# Patient Record
Sex: FEMALE | Race: WHITE | NOT HISPANIC OR LATINO | Employment: FULL TIME | URBAN - NONMETROPOLITAN AREA
[De-identification: names, ages, dates, MRNs, and addresses within clinical notes are randomized per-mention and may not be internally consistent; named-entity substitution may affect disease eponyms.]

---

## 2018-05-23 ENCOUNTER — TRANSCRIBE ORDERS (OUTPATIENT)
Dept: LAB | Facility: HOSPITAL | Age: 38
End: 2018-05-23

## 2018-05-23 ENCOUNTER — APPOINTMENT (OUTPATIENT)
Dept: LAB | Facility: HOSPITAL | Age: 38
End: 2018-05-23

## 2018-05-23 DIAGNOSIS — B35.1 ONYCHOMYCOSIS: Primary | ICD-10-CM

## 2018-05-23 LAB — KOH PREP NAIL: NORMAL

## 2018-05-23 PROCEDURE — 87102 FUNGUS ISOLATION CULTURE: CPT | Performed by: NURSE PRACTITIONER

## 2018-05-23 PROCEDURE — 87220 TISSUE EXAM FOR FUNGI: CPT | Performed by: NURSE PRACTITIONER

## 2018-06-20 LAB — FUNGUS WND CULT: NORMAL

## 2018-08-06 ENCOUNTER — OFFICE VISIT (OUTPATIENT)
Dept: FAMILY MEDICINE CLINIC | Facility: CLINIC | Age: 38
End: 2018-08-06

## 2018-08-06 VITALS
WEIGHT: 128 LBS | SYSTOLIC BLOOD PRESSURE: 124 MMHG | BODY MASS INDEX: 21.85 KG/M2 | DIASTOLIC BLOOD PRESSURE: 80 MMHG | HEIGHT: 64 IN

## 2018-08-06 DIAGNOSIS — H93.13 TINNITUS OF BOTH EARS: Primary | ICD-10-CM

## 2018-08-06 DIAGNOSIS — Z00.00 GENERAL MEDICAL EXAM: ICD-10-CM

## 2018-08-06 DIAGNOSIS — H93.13 TINNITUS OF BOTH EARS: ICD-10-CM

## 2018-08-06 DIAGNOSIS — R20.9 SKIN SENSATION DISTURBANCE: ICD-10-CM

## 2018-08-06 DIAGNOSIS — H53.9 CHANGES IN VISION: ICD-10-CM

## 2018-08-06 DIAGNOSIS — R42 VERTIGO: ICD-10-CM

## 2018-08-06 DIAGNOSIS — R53.81 MALAISE AND FATIGUE: ICD-10-CM

## 2018-08-06 DIAGNOSIS — R53.83 MALAISE AND FATIGUE: ICD-10-CM

## 2018-08-06 DIAGNOSIS — R42 VERTIGO: Primary | ICD-10-CM

## 2018-08-06 PROCEDURE — 99204 OFFICE O/P NEW MOD 45 MIN: CPT | Performed by: NURSE PRACTITIONER

## 2018-08-06 RX ORDER — BUSPIRONE HYDROCHLORIDE 10 MG/1
TABLET ORAL
Qty: 60 TABLET | Refills: 1 | Status: SHIPPED | OUTPATIENT
Start: 2018-08-06 | End: 2019-08-30

## 2018-08-06 RX ORDER — ESCITALOPRAM OXALATE 10 MG/1
10 TABLET ORAL DAILY
Qty: 30 TABLET | Refills: 11 | Status: SHIPPED | OUTPATIENT
Start: 2018-08-06 | End: 2019-08-30

## 2018-08-06 RX ORDER — TRIAMTERENE AND HYDROCHLOROTHIAZIDE 37.5; 25 MG/1; MG/1
1 TABLET ORAL DAILY
Qty: 30 TABLET | Refills: 11 | Status: SHIPPED | OUTPATIENT
Start: 2018-08-06 | End: 2019-08-30

## 2018-08-06 NOTE — PROGRESS NOTES
Chief Complaint   Patient presents with   • Establish Care     balance issues and dizziness     Subjective   Kathy Munoz is a 37 y.o. female.     Presents with dizziness, worsening vertigo and vision changes-mother has MS-dx in her early 40's-the current patient is having the same symptoms as her mom did before dx-vertigo is worsening       Dizziness   This is a recurrent problem. The current episode started more than 1 year ago. The problem occurs constantly. The problem has been gradually worsening. Associated symptoms include numbness and vertigo. Pertinent negatives include no abdominal pain, anorexia, arthralgias, change in bowel habit, chest pain, chills, congestion, coughing, diaphoresis, fatigue, fever, headaches, joint swelling, myalgias, nausea, neck pain, rash, sore throat, swollen glands, urinary symptoms, visual change, vomiting or weakness. The symptoms are aggravated by walking. She has tried rest for the symptoms. The treatment provided mild relief.   Anxiety   Presents for follow-up visit. Symptoms include decreased concentration, dizziness, excessive worry, insomnia, irritability, malaise, nervous/anxious behavior and panic. Patient reports no chest pain, compulsions, confusion, feeling of choking, hyperventilation, impotence, muscle tension, nausea, obsessions, palpitations, restlessness, shortness of breath or suicidal ideas. Symptoms occur most days.       Eye Problem    Both eyes are affected.This is a new problem. The current episode started 1 to 4 weeks ago. The problem occurs every several days. The problem has been gradually worsening. There was no injury mechanism. The patient is experiencing no pain. There is no known exposure to pink eye. Associated symptoms include blurred vision, an eye discharge, double vision, a foreign body sensation and photophobia. Pertinent negatives include no eye redness, fever, itching, nausea, vomiting or weakness. She has tried nothing for the  symptoms. The treatment provided no relief.        The following portions of the patient's history were reviewed and updated as appropriate: allergies, current medications, past social history and problem list.    Review of Systems   Constitutional: Positive for irritability. Negative for appetite change, chills, diaphoresis, fatigue, fever and unexpected weight change.   HENT: Positive for hearing loss and tinnitus. Negative for congestion, sore throat, trouble swallowing and voice change.    Eyes: Positive for blurred vision, double vision, photophobia, discharge and visual disturbance. Negative for pain, redness and itching.   Respiratory: Negative.  Negative for apnea, cough, choking, chest tightness, shortness of breath, wheezing and stridor.    Cardiovascular: Negative.  Negative for chest pain, palpitations and leg swelling.   Gastrointestinal: Negative.  Negative for abdominal pain, anal bleeding, anorexia, blood in stool, change in bowel habit, constipation, nausea and vomiting.   Endocrine: Negative.  Negative for cold intolerance, heat intolerance, polydipsia, polyphagia and polyuria.   Genitourinary: Negative.  Negative for difficulty urinating and impotence.   Musculoskeletal: Negative for arthralgias, back pain, gait problem, joint swelling, myalgias, neck pain and neck stiffness.   Skin: Negative.  Negative for color change, pallor and rash.   Allergic/Immunologic: Negative.  Negative for environmental allergies, food allergies and immunocompromised state.   Neurological: Positive for dizziness, vertigo, light-headedness and numbness. Negative for tremors, seizures, syncope, facial asymmetry, speech difficulty, weakness and headaches.        Tingling around nose and both fingers-concerned that symptoms are worsening    Hematological: Negative.  Negative for adenopathy. Does not bruise/bleed easily.   Psychiatric/Behavioral: Positive for decreased concentration and sleep disturbance. Negative for  "agitation, behavioral problems, confusion, dysphoric mood, hallucinations, self-injury and suicidal ideas. The patient is nervous/anxious and has insomnia. The patient is not hyperactive.        Objective   /80   Ht 162.6 cm (64\")   Wt 58.1 kg (128 lb)   BMI 21.97 kg/m²   Physical Exam   Constitutional: She is oriented to person, place, and time. She appears well-developed and well-nourished.   HENT:   Head: Normocephalic and atraumatic.   Right Ear: External ear normal.   Left Ear: External ear normal.   Mouth/Throat: Oropharynx is clear and moist.   Eyes: Pupils are equal, round, and reactive to light. EOM are normal. Right eye exhibits no discharge. Left eye exhibits no discharge. No scleral icterus.   Neck: Normal range of motion. Neck supple. No JVD present. No tracheal deviation present. No thyromegaly present.   Cardiovascular: Normal rate, regular rhythm, normal heart sounds and intact distal pulses.  Exam reveals no friction rub.    No murmur heard.  Pulmonary/Chest: Effort normal and breath sounds normal. No stridor. No respiratory distress. She has no wheezes. She has no rales. She exhibits no tenderness.   Abdominal: Soft. Bowel sounds are normal. She exhibits no distension and no mass. There is no tenderness. There is no rebound and no guarding. No hernia.   Musculoskeletal: Normal range of motion. She exhibits no edema, tenderness or deformity.   Lymphadenopathy:     She has no cervical adenopathy.   Neurological: She is alert and oriented to person, place, and time. She displays normal reflexes. No cranial nerve deficit or sensory deficit. She exhibits normal muscle tone. Coordination normal.   Nystagmus with lateral gaze-especially to the left -vertigo and vision change reproduced during exam    Skin: Skin is warm and dry. No rash noted. No erythema. No pallor.   Nursing note and vitals reviewed.      Assessment/Plan   Problem List Items Addressed This Visit        Nervous and Auditory    " Tinnitus of both ears    Relevant Orders    MRI Brain Without Contrast    Skin sensation disturbance       Other    Malaise and fatigue    Relevant Orders    CBC & Differential    Comprehensive Metabolic Panel    Iron    Vitamin D 25 Hydroxy    Vitamin B12    TSH    Magnesium    Lipid Panel    MRI Brain Without Contrast    Vertigo - Primary    Relevant Orders    CBC & Differential    Comprehensive Metabolic Panel    Iron    Vitamin D 25 Hydroxy    Vitamin B12    TSH    Magnesium    Lipid Panel    MRI Brain Without Contrast    General medical exam    Relevant Orders    MRI Brain Without Contrast    Changes in vision           New Medications Ordered This Visit   Medications   • busPIRone (BUSPAR) 10 MG tablet     Sig: Use tid prn     Dispense:  60 tablet     Refill:  1   • triamterene-hydrochlorothiazide (MAXZIDE-25) 37.5-25 MG per tablet     Sig: Take 1 tablet by mouth Daily.     Dispense:  30 tablet     Refill:  11   • escitalopram (LEXAPRO) 10 MG tablet     Sig: Take 1 tablet by mouth Daily.     Dispense:  30 tablet     Refill:  11       Mri brain to rule out MS due to having the same symptoms as her mother and her mother has MS dx in her 40's-ER if worsen patient agrees with plan of action     Stress relief discussed in length. Consider therapy, suggest yoga, exercise, meditation -patient is agrees-will call back if worsen for referral

## 2018-08-09 ENCOUNTER — APPOINTMENT (OUTPATIENT)
Dept: LAB | Facility: HOSPITAL | Age: 38
End: 2018-08-09

## 2018-08-09 ENCOUNTER — TELEPHONE (OUTPATIENT)
Dept: FAMILY MEDICINE CLINIC | Facility: CLINIC | Age: 38
End: 2018-08-09

## 2018-08-09 LAB
25(OH)D3 SERPL-MCNC: 44.8 NG/ML (ref 30–100)
ALBUMIN SERPL-MCNC: 4.6 G/DL (ref 3.4–4.8)
ALBUMIN/GLOB SERPL: 1.3 G/DL (ref 1.1–1.8)
ALP SERPL-CCNC: 55 U/L (ref 38–126)
ALT SERPL W P-5'-P-CCNC: 21 U/L (ref 9–52)
ANION GAP SERPL CALCULATED.3IONS-SCNC: 10 MMOL/L (ref 5–15)
ARTICHOKE IGE QN: 87 MG/DL (ref 1–129)
AST SERPL-CCNC: 28 U/L (ref 14–36)
BASOPHILS # BLD AUTO: 0.04 10*3/MM3 (ref 0–0.2)
BASOPHILS NFR BLD AUTO: 0.6 % (ref 0–2)
BILIRUB SERPL-MCNC: 0.5 MG/DL (ref 0.2–1.3)
BUN BLD-MCNC: 18 MG/DL (ref 7–21)
BUN/CREAT SERPL: 27.7 (ref 7–25)
CALCIUM SPEC-SCNC: 9.4 MG/DL (ref 8.4–10.2)
CHLORIDE SERPL-SCNC: 104 MMOL/L (ref 95–110)
CHOLEST SERPL-MCNC: 162 MG/DL (ref 0–199)
CO2 SERPL-SCNC: 26 MMOL/L (ref 22–31)
CREAT BLD-MCNC: 0.65 MG/DL (ref 0.5–1)
DEPRECATED RDW RBC AUTO: 40.4 FL (ref 36.4–46.3)
EOSINOPHIL # BLD AUTO: 0.09 10*3/MM3 (ref 0–0.7)
EOSINOPHIL NFR BLD AUTO: 1.4 % (ref 0–7)
ERYTHROCYTE [DISTWIDTH] IN BLOOD BY AUTOMATED COUNT: 13 % (ref 11.5–14.5)
GFR SERPL CREATININE-BSD FRML MDRD: 102 ML/MIN/1.73 (ref 64–149)
GLOBULIN UR ELPH-MCNC: 3.6 GM/DL (ref 2.3–3.5)
GLUCOSE BLD-MCNC: 89 MG/DL (ref 60–100)
HCT VFR BLD AUTO: 40 % (ref 35–45)
HDLC SERPL-MCNC: 48 MG/DL (ref 60–200)
HGB BLD-MCNC: 13.8 G/DL (ref 12–15.5)
IMM GRANULOCYTES # BLD: 0.02 10*3/MM3 (ref 0–0.02)
IMM GRANULOCYTES NFR BLD: 0.3 % (ref 0–0.5)
IRON 24H UR-MRATE: 129 MCG/DL (ref 37–170)
LDLC/HDLC SERPL: 2.14 {RATIO} (ref 0–3.22)
LYMPHOCYTES # BLD AUTO: 2.21 10*3/MM3 (ref 0.6–4.2)
LYMPHOCYTES NFR BLD AUTO: 34.3 % (ref 10–50)
MAGNESIUM SERPL-MCNC: 2.6 MG/DL (ref 1.6–2.3)
MCH RBC QN AUTO: 29.6 PG (ref 26.5–34)
MCHC RBC AUTO-ENTMCNC: 34.5 G/DL (ref 31.4–36)
MCV RBC AUTO: 85.7 FL (ref 80–98)
MONOCYTES # BLD AUTO: 0.54 10*3/MM3 (ref 0–0.9)
MONOCYTES NFR BLD AUTO: 8.4 % (ref 0–12)
NEUTROPHILS # BLD AUTO: 3.54 10*3/MM3 (ref 2–8.6)
NEUTROPHILS NFR BLD AUTO: 55 % (ref 37–80)
PLATELET # BLD AUTO: 348 10*3/MM3 (ref 150–450)
PMV BLD AUTO: 9.8 FL (ref 8–12)
POTASSIUM BLD-SCNC: 4.3 MMOL/L (ref 3.5–5.1)
PROT SERPL-MCNC: 8.2 G/DL (ref 6.3–8.6)
RBC # BLD AUTO: 4.67 10*6/MM3 (ref 3.77–5.16)
SODIUM BLD-SCNC: 140 MMOL/L (ref 137–145)
TRIGL SERPL-MCNC: 56 MG/DL (ref 20–199)
TSH SERPL DL<=0.05 MIU/L-ACNC: 2.28 MIU/ML (ref 0.46–4.68)
VIT B12 BLD-MCNC: 281 PG/ML (ref 239–931)
WBC NRBC COR # BLD: 6.44 10*3/MM3 (ref 3.2–9.8)

## 2018-08-09 PROCEDURE — 80053 COMPREHEN METABOLIC PANEL: CPT | Performed by: NURSE PRACTITIONER

## 2018-08-09 PROCEDURE — 83540 ASSAY OF IRON: CPT | Performed by: NURSE PRACTITIONER

## 2018-08-09 PROCEDURE — 82306 VITAMIN D 25 HYDROXY: CPT | Performed by: NURSE PRACTITIONER

## 2018-08-09 PROCEDURE — 85025 COMPLETE CBC W/AUTO DIFF WBC: CPT | Performed by: NURSE PRACTITIONER

## 2018-08-09 PROCEDURE — 80061 LIPID PANEL: CPT | Performed by: NURSE PRACTITIONER

## 2018-08-09 PROCEDURE — 84443 ASSAY THYROID STIM HORMONE: CPT | Performed by: NURSE PRACTITIONER

## 2018-08-09 PROCEDURE — 82607 VITAMIN B-12: CPT | Performed by: NURSE PRACTITIONER

## 2018-08-09 PROCEDURE — 83735 ASSAY OF MAGNESIUM: CPT | Performed by: NURSE PRACTITIONER

## 2018-08-09 PROCEDURE — 36415 COLL VENOUS BLD VENIPUNCTURE: CPT | Performed by: NURSE PRACTITIONER

## 2018-08-09 NOTE — TELEPHONE ENCOUNTER
----- Message from ROYER Rashid sent at 8/9/2018  4:06 PM CDT -----  Can you let her know normal except magnesium is a little high -see if she is taking a supplement-if so stop it-more water otherwise labs are normal

## 2018-08-15 ENCOUNTER — APPOINTMENT (OUTPATIENT)
Dept: MRI IMAGING | Facility: HOSPITAL | Age: 38
End: 2018-08-15

## 2018-08-21 ENCOUNTER — APPOINTMENT (OUTPATIENT)
Dept: MRI IMAGING | Facility: HOSPITAL | Age: 38
End: 2018-08-21

## 2018-08-30 ENCOUNTER — HOSPITAL ENCOUNTER (OUTPATIENT)
Dept: MRI IMAGING | Facility: HOSPITAL | Age: 38
Discharge: HOME | End: 2018-08-30
Admitting: NURSE PRACTITIONER

## 2018-08-30 DIAGNOSIS — H93.13 TINNITUS OF BOTH EARS: ICD-10-CM

## 2018-08-30 DIAGNOSIS — R42 VERTIGO: ICD-10-CM

## 2018-08-30 PROCEDURE — 70551 MRI BRAIN STEM W/O DYE: CPT

## 2019-03-07 DIAGNOSIS — Z80.3 FH: BREAST CANCER IN FIRST DEGREE RELATIVE: Primary | ICD-10-CM

## 2019-03-08 ENCOUNTER — TELEPHONE (OUTPATIENT)
Dept: FAMILY MEDICINE CLINIC | Facility: CLINIC | Age: 39
End: 2019-03-08

## 2019-03-08 DIAGNOSIS — Z12.31 ENCOUNTER FOR SCREENING MAMMOGRAM FOR MALIGNANT NEOPLASM OF BREAST: Primary | ICD-10-CM

## 2019-03-08 NOTE — TELEPHONE ENCOUNTER
[facility] CENTRAL SCHEDULING IS NEEDING  YOU TO CORRECT THE MAMMOGRAM ORDERS ON NAEEM RAUDEL  THEY HAVE TO HAVE A DIGN CODE FOR SCREENING PURPOSES  PLEASE ADD AND FX BACK  [phone]  YOU CAN CALL [facility]  EXT 1

## 2019-08-30 ENCOUNTER — OFFICE VISIT (OUTPATIENT)
Dept: FAMILY MEDICINE CLINIC | Facility: CLINIC | Age: 39
End: 2019-08-30

## 2019-08-30 VITALS
SYSTOLIC BLOOD PRESSURE: 120 MMHG | DIASTOLIC BLOOD PRESSURE: 80 MMHG | WEIGHT: 116 LBS | BODY MASS INDEX: 19.81 KG/M2 | HEIGHT: 64 IN

## 2019-08-30 DIAGNOSIS — Z00.00 WELLNESS EXAMINATION: Primary | ICD-10-CM

## 2019-08-30 DIAGNOSIS — Z12.4 ENCOUNTER FOR PAPANICOLAOU SMEAR FOR CERVICAL CANCER SCREENING: ICD-10-CM

## 2019-08-30 PROCEDURE — 87624 HPV HI-RISK TYP POOLED RSLT: CPT | Performed by: NURSE PRACTITIONER

## 2019-08-30 PROCEDURE — 99395 PREV VISIT EST AGE 18-39: CPT | Performed by: NURSE PRACTITIONER

## 2019-08-30 PROCEDURE — G0123 SCREEN CERV/VAG THIN LAYER: HCPCS | Performed by: NURSE PRACTITIONER

## 2019-08-30 NOTE — PROGRESS NOTES
Chief Complaint   Patient presents with   • Annual Exam     pap smear     Subjective   Kathy Munoz is a 38 y.o. female.     Gynecologic Exam   The patient's pertinent negatives include no genital itching, genital lesions, genital odor, genital rash, missed menses, pelvic pain or vaginal discharge. The patient is experiencing no pain. She is not pregnant. Pertinent negatives include no back pain, chills, constipation, diarrhea, dysuria, fever, headaches, hematuria, joint pain, nausea or rash. The treatment provided significant relief. She is sexually active.        The following portions of the patient's history were reviewed and updated as appropriate: allergies, current medications, past social history and problem list.    Review of Systems   Constitutional: Negative.  Negative for activity change, appetite change, chills, diaphoresis, fatigue, fever and unexpected weight change.   HENT: Negative.    Eyes: Negative.  Negative for photophobia, discharge, itching and visual disturbance.   Respiratory: Negative.    Cardiovascular: Negative.    Gastrointestinal: Negative.  Negative for constipation, diarrhea and nausea.   Endocrine: Negative.  Negative for cold intolerance, heat intolerance, polydipsia, polyphagia and polyuria.   Genitourinary: Negative.  Negative for dysuria, hematuria, menstrual problem, missed menses, pelvic pain and vaginal discharge.   Musculoskeletal: Negative.  Negative for back pain and joint pain.   Skin: Negative.  Negative for rash.   Allergic/Immunologic: Negative.  Negative for environmental allergies, food allergies and immunocompromised state.   Neurological: Negative.  Negative for tremors, seizures, syncope, speech difficulty, weakness, light-headedness, numbness and headaches.   Hematological: Negative.  Negative for adenopathy. Does not bruise/bleed easily.   Psychiatric/Behavioral: Negative.  Negative for agitation, sleep disturbance and suicidal ideas.       Objective  "  /80   Ht 162.6 cm (64\")   Wt 52.6 kg (116 lb)   BMI 19.91 kg/m²   Physical Exam   Constitutional: She is oriented to person, place, and time. She appears well-developed and well-nourished. No distress.   HENT:   Head: Normocephalic and atraumatic.   Right Ear: External ear normal.   Left Ear: External ear normal.   Mouth/Throat: Oropharynx is clear and moist. No oropharyngeal exudate.   Eyes: EOM are normal. Pupils are equal, round, and reactive to light. Right eye exhibits no discharge. Left eye exhibits no discharge. No scleral icterus.   Neck: Normal range of motion. Neck supple. No JVD present. No tracheal deviation present. No thyromegaly present.   Cardiovascular: Normal rate, regular rhythm, normal heart sounds and intact distal pulses. Exam reveals no gallop and no friction rub.   No murmur heard.  Pulmonary/Chest: Effort normal and breath sounds normal. No stridor. No respiratory distress. She has no wheezes. She has no rales. She exhibits no tenderness.   Implants bilaterally    Abdominal: Soft. Bowel sounds are normal. She exhibits no distension and no mass. There is no tenderness. There is no rebound and no guarding. No hernia.   Genitourinary: Rectum normal, vagina normal and uterus normal. Cervix exhibits no motion tenderness and no friability. Right adnexum displays no mass, no tenderness and no fullness. Left adnexum displays no mass, no tenderness and no fullness. No erythema, tenderness or bleeding in the vagina. No foreign body in the vagina. No signs of injury around the vagina. No vaginal discharge found.   Musculoskeletal: Normal range of motion. She exhibits no edema, tenderness or deformity.   Lymphadenopathy:     She has no cervical adenopathy.   Neurological: She is alert and oriented to person, place, and time. She displays normal reflexes. No cranial nerve deficit or sensory deficit. She exhibits normal muscle tone. Coordination normal.   Skin: Skin is warm and dry. No rash " noted. She is not diaphoretic. No erythema. No pallor.   Nursing note and vitals reviewed.      Assessment/Plan   Problem List Items Addressed This Visit        Other    Wellness examination - Primary         No orders of the defined types were placed in this encounter.      It's not just what you eat, but when you eat  Eat breakfast, and eat smaller meals throughout the day. A healthy breakfast can jumpstart your metabolism, while eating small, healthy meals (rather than the standard three large meals) keeps your energy up.   Avoid eating at night. Try to eat dinner earlier and fast for 14-16 hours until breakfast the next morning. Studies suggest that eating only when you’re most active and giving your digestive system a long break each day may help to regulate weight.     No changes for now -mammogram utd from another facility, labs ordered for fasting

## 2019-09-09 LAB
GEN CATEG CVX/VAG CYTO-IMP: NORMAL
LAB AP CASE REPORT: NORMAL
LAB AP GYN ADDITIONAL INFORMATION: NORMAL
PATH INTERP SPEC-IMP: NORMAL
STAT OF ADQ CVX/VAG CYTO-IMP: NORMAL

## 2019-09-10 ENCOUNTER — APPOINTMENT (OUTPATIENT)
Dept: LAB | Facility: HOSPITAL | Age: 39
End: 2019-09-10

## 2019-09-10 LAB
25(OH)D3 SERPL-MCNC: 45.8 NG/ML (ref 30–100)
ALBUMIN SERPL-MCNC: 4.6 G/DL (ref 3.5–5.2)
ALBUMIN/GLOB SERPL: 1.6 G/DL
ALP SERPL-CCNC: 51 U/L (ref 39–117)
ALT SERPL W P-5'-P-CCNC: 7 U/L (ref 1–33)
ANION GAP SERPL CALCULATED.3IONS-SCNC: 12.8 MMOL/L (ref 5–15)
AST SERPL-CCNC: 16 U/L (ref 1–32)
BASOPHILS # BLD AUTO: 0.07 10*3/MM3 (ref 0–0.2)
BASOPHILS NFR BLD AUTO: 1.4 % (ref 0–1.5)
BILIRUB SERPL-MCNC: 0.4 MG/DL (ref 0.2–1.2)
BUN BLD-MCNC: 17 MG/DL (ref 6–20)
BUN/CREAT SERPL: 23.9 (ref 7–25)
CALCIUM SPEC-SCNC: 9.7 MG/DL (ref 8.6–10.5)
CHLORIDE SERPL-SCNC: 102 MMOL/L (ref 98–107)
CHOLEST SERPL-MCNC: 158 MG/DL (ref 0–200)
CO2 SERPL-SCNC: 25.2 MMOL/L (ref 22–29)
CREAT BLD-MCNC: 0.71 MG/DL (ref 0.57–1)
DEPRECATED RDW RBC AUTO: 44 FL (ref 37–54)
EOSINOPHIL # BLD AUTO: 0.11 10*3/MM3 (ref 0–0.4)
EOSINOPHIL NFR BLD AUTO: 2.2 % (ref 0.3–6.2)
ERYTHROCYTE [DISTWIDTH] IN BLOOD BY AUTOMATED COUNT: 13.2 % (ref 12.3–15.4)
GFR SERPL CREATININE-BSD FRML MDRD: 92 ML/MIN/1.73
GLOBULIN UR ELPH-MCNC: 2.9 GM/DL
GLUCOSE BLD-MCNC: 80 MG/DL (ref 65–99)
HCT VFR BLD AUTO: 43 % (ref 34–46.6)
HDLC SERPL-MCNC: 50 MG/DL (ref 40–60)
HGB BLD-MCNC: 14 G/DL (ref 12–15.9)
IMM GRANULOCYTES # BLD AUTO: 0.01 10*3/MM3 (ref 0–0.05)
IMM GRANULOCYTES NFR BLD AUTO: 0.2 % (ref 0–0.5)
IRON 24H UR-MRATE: 108 MCG/DL (ref 37–145)
LDLC SERPL CALC-MCNC: 92 MG/DL (ref 0–100)
LDLC/HDLC SERPL: 1.84 {RATIO}
LYMPHOCYTES # BLD AUTO: 2.17 10*3/MM3 (ref 0.7–3.1)
LYMPHOCYTES NFR BLD AUTO: 42.7 % (ref 19.6–45.3)
MAGNESIUM SERPL-MCNC: 2.7 MG/DL (ref 1.6–2.6)
MCH RBC QN AUTO: 29.5 PG (ref 26.6–33)
MCHC RBC AUTO-ENTMCNC: 32.6 G/DL (ref 31.5–35.7)
MCV RBC AUTO: 90.5 FL (ref 79–97)
MONOCYTES # BLD AUTO: 0.4 10*3/MM3 (ref 0.1–0.9)
MONOCYTES NFR BLD AUTO: 7.9 % (ref 5–12)
NEUTROPHILS # BLD AUTO: 2.32 10*3/MM3 (ref 1.7–7)
NEUTROPHILS NFR BLD AUTO: 45.6 % (ref 42.7–76)
NRBC BLD AUTO-RTO: 0 /100 WBC (ref 0–0.2)
PLATELET # BLD AUTO: 292 10*3/MM3 (ref 140–450)
PMV BLD AUTO: 10.6 FL (ref 6–12)
POTASSIUM BLD-SCNC: 4.2 MMOL/L (ref 3.5–5.2)
PROT SERPL-MCNC: 7.5 G/DL (ref 6–8.5)
RBC # BLD AUTO: 4.75 10*6/MM3 (ref 3.77–5.28)
SODIUM BLD-SCNC: 140 MMOL/L (ref 136–145)
TRIGL SERPL-MCNC: 79 MG/DL (ref 0–150)
TSH SERPL DL<=0.05 MIU/L-ACNC: 2.87 UIU/ML (ref 0.27–4.2)
VIT B12 BLD-MCNC: 262 PG/ML (ref 211–946)
VLDLC SERPL-MCNC: 15.8 MG/DL (ref 5–40)
WBC NRBC COR # BLD: 5.08 10*3/MM3 (ref 3.4–10.8)

## 2019-09-10 PROCEDURE — 36415 COLL VENOUS BLD VENIPUNCTURE: CPT | Performed by: NURSE PRACTITIONER

## 2019-09-10 PROCEDURE — 82306 VITAMIN D 25 HYDROXY: CPT | Performed by: NURSE PRACTITIONER

## 2019-09-10 PROCEDURE — 80061 LIPID PANEL: CPT | Performed by: NURSE PRACTITIONER

## 2019-09-10 PROCEDURE — 83540 ASSAY OF IRON: CPT | Performed by: NURSE PRACTITIONER

## 2019-09-10 PROCEDURE — 85025 COMPLETE CBC W/AUTO DIFF WBC: CPT | Performed by: NURSE PRACTITIONER

## 2019-09-10 PROCEDURE — 82607 VITAMIN B-12: CPT | Performed by: NURSE PRACTITIONER

## 2019-09-10 PROCEDURE — 83735 ASSAY OF MAGNESIUM: CPT | Performed by: NURSE PRACTITIONER

## 2019-09-10 PROCEDURE — 80053 COMPREHEN METABOLIC PANEL: CPT | Performed by: NURSE PRACTITIONER

## 2019-09-10 PROCEDURE — 84443 ASSAY THYROID STIM HORMONE: CPT | Performed by: NURSE PRACTITIONER

## 2019-09-11 ENCOUNTER — TELEPHONE (OUTPATIENT)
Dept: FAMILY MEDICINE CLINIC | Facility: CLINIC | Age: 39
End: 2019-09-11

## 2019-09-11 DIAGNOSIS — R79.0 ABNORMAL BLOOD LEVEL OF MAGNESIUM: Primary | ICD-10-CM

## 2019-09-11 LAB — HPV I/H RISK 4 DNA CVX QL PROBE+SIG AMP: NEGATIVE

## 2019-09-11 NOTE — TELEPHONE ENCOUNTER
-Per ROYER Poole, Ms. Munoz has been called with recent lab, HPV and Pap Smear results & recommendations.  Continue current medications and follow-up as planned or sooner if any problems.        ---- Message from ROYER Small sent at 9/11/2019  8:04 AM CDT -----  Can you let her now her labs look good no problems noted-her mag is a little high -could just be a lab error-see if she is taking otc vitamins-if she wants we can check it at our lab to verify-I am not worried about it.

## 2019-09-21 ENCOUNTER — LAB (OUTPATIENT)
Dept: LAB | Facility: HOSPITAL | Age: 39
End: 2019-09-21

## 2019-09-21 DIAGNOSIS — R79.0 ABNORMAL BLOOD LEVEL OF MAGNESIUM: ICD-10-CM

## 2019-09-21 LAB — MAGNESIUM SERPL-MCNC: 2.4 MG/DL (ref 1.6–2.6)

## 2019-09-21 PROCEDURE — 83735 ASSAY OF MAGNESIUM: CPT

## 2019-09-21 PROCEDURE — 36415 COLL VENOUS BLD VENIPUNCTURE: CPT

## 2020-06-09 DIAGNOSIS — R00.2 PALPITATIONS: Primary | ICD-10-CM

## 2020-06-29 ENCOUNTER — HOSPITAL ENCOUNTER (OUTPATIENT)
Dept: CARDIOLOGY | Facility: HOSPITAL | Age: 40
Discharge: HOME | End: 2020-06-29

## 2020-06-29 DIAGNOSIS — R00.2 PALPITATIONS: ICD-10-CM

## 2020-06-29 DIAGNOSIS — R07.9 CHEST PAIN, UNSPECIFIED TYPE: ICD-10-CM

## 2020-06-29 LAB
BH CV STRESS BP STAGE 1: NORMAL
BH CV STRESS BP STAGE 2: NORMAL
BH CV STRESS BP STAGE 3: NORMAL
BH CV STRESS DURATION MIN STAGE 1: 3
BH CV STRESS DURATION MIN STAGE 2: 3
BH CV STRESS DURATION MIN STAGE 3: 2
BH CV STRESS DURATION SEC STAGE 1: 0
BH CV STRESS DURATION SEC STAGE 2: 0
BH CV STRESS DURATION SEC STAGE 3: 30
BH CV STRESS GRADE STAGE 1: 10
BH CV STRESS GRADE STAGE 2: 12
BH CV STRESS GRADE STAGE 3: 14
BH CV STRESS HR STAGE 1: 117
BH CV STRESS HR STAGE 2: 153
BH CV STRESS HR STAGE 3: 160
BH CV STRESS METS STAGE 1: 5
BH CV STRESS METS STAGE 2: 7.5
BH CV STRESS METS STAGE 3: 10
BH CV STRESS PROTOCOL 1: NORMAL
BH CV STRESS RECOVERY BP: NORMAL MMHG
BH CV STRESS RECOVERY HR: 122 BPM
BH CV STRESS SPEED STAGE 1: 1.7
BH CV STRESS SPEED STAGE 2: 2.5
BH CV STRESS SPEED STAGE 3: 3.4
BH CV STRESS STAGE 1: 1
BH CV STRESS STAGE 2: 2
BH CV STRESS STAGE 3: 3
MAXIMAL PREDICTED HEART RATE: 180 BPM
PERCENT MAX PREDICTED HR: 91.11 %
STRESS BASELINE BP: NORMAL MMHG
STRESS BASELINE HR: 88 BPM
STRESS PERCENT HR: 107 %
STRESS POST ESTIMATED WORKLOAD: 10.1 METS
STRESS POST EXERCISE DUR MIN: 8 MIN
STRESS POST EXERCISE DUR SEC: 29 SEC
STRESS POST PEAK BP: NORMAL MMHG
STRESS POST PEAK HR: 164 BPM
STRESS TARGET HR: 153 BPM

## 2020-06-29 PROCEDURE — 93017 CV STRESS TEST TRACING ONLY: CPT

## 2020-06-29 NOTE — H&P
Cardiology History and Physical Note        Patient Name: Kathy Munoz  Age/Sex: 39 y.o. female  : 1980  MRN: [id number]    Date of Admission : 2020    Primary care Physician: Vickie Ellison APRN    Reason for Admission: Chest pain exercise stress test    Subjective:       Chief Complaint: Chest pain      History of Present Illness:  Kathy Munoz is a 39 y.o. female     Height of 5 feet 4 inch weight of 110 pounds with a body mask index of 18 with a past medical history significant for chest pain symptomatic palpitation, hyperlipidemia, asthmatic bronchitis, negative exercise Cardiolite stress test done in , negative Holter monitor recording in 2017, history of rheumatic fever as a child with previous evaluation by Dr. Galeana, strong family history for coronary artery disease with previous head up tilt table testing done in  which was negative for any neurocardiogenic etiology for syncope, previous evaluation by neurology with an MRI and vitamin B12 and vitamin D level evaluation with vitamin D deficiency and strong family history for atherosclerotic coronary artery disease with mother who had coronary artery bypass grafting.    Patient presents with symptoms of lightheaded dizziness and symptomatic palpitation.  Patient on questioning has had very atypical symptoms of chest pain.  Patient has had symptoms of palpitations on and off.  Patient has noticed a heart rate to be 40 bpm.    Patient on questioning complains of having symptoms of palpitation and shortness of breath.  Patient denies excessive intake of coffee or caffeinated beverage.  Patient denies taking any over-the-counter decongestion medication.    Patient complains of having shortness of breath with exertion.  Patient denies any lower extremity edema.  Patient denies any fever chill productive cough.    Patient symptoms of chest pain is describes as a fluttering in the chest short lasting.  Patient denies any  complete loss of consciousness.  Patient denies any bleeding diastasis of hemoptysis hematuria bright red blood per rectum.    Patient 10 point review of system except for stated in the history of present illness and negative.      Past Medical History:  1.  Chest pain palpitation shortness of breath.  2.  Lightheaded dizziness with negative head up tilt table testing for neurocardiogenic etiology for syncope in 2013.  3.  Negative exercise Cardiolite stress test in 2016.  4.  Negative Holter monitor for any evidence of tachyarrhythmia or bradycardia arrhythmia 2017.  5.  History of rheumatic fever with previous evaluation by Dr. Galeana.  6.  Previous evaluation by neurology including an MRI.  7.  History of vitamin D deficiency.  8.  Asthmatic bronchitis.  9.  Insomnia.  10.  Hyperlipidemia      Past Surgical History:  1.  Breast augmentation.      Family History: Family history significant for mother who had coronary artery bypass grafting.      Social History: Denies any tobacco alcohol intake.       Cardiac Risk factor:   1.  Strong family history for coronary artery disease.  2.  Hyperlipidemia    Allergies:  Allergies   Allergen Reactions   • Avelox [Moxifloxacin] Swelling     Throat swelling    • Codeine Nausea And Vomiting       Home Medication::  Prior to Admission medications    Medication Sig Start Date End Date Taking? Authorizing Provider   linaclotide (LINZESS) 290 MCG capsule capsule Take 290 mcg by mouth Every Morning Before Breakfast.    Emergency, Nurse Genevieve, RN   pravastatin (PRAVACHOL) 40 MG tablet Take 40 mg by mouth Daily.    Emergency, Nurse Genevieve, RN         Review of Systems   Constitutional: Negative for chills, fever and unexpected weight change.   HENT: Negative for hearing loss and nosebleeds.    Eyes: Negative for visual disturbance.   Respiratory: Positive for shortness of breath. Negative for cough, chest tightness and wheezing.    Cardiovascular: Positive for chest pain and  palpitations. Negative for leg swelling.   Gastrointestinal: Negative for abdominal pain, blood in stool, constipation, diarrhea, nausea and vomiting.   Endocrine: Negative for cold intolerance, heat intolerance, polydipsia, polyphagia and polyuria.   Genitourinary: Negative for hematuria.   Musculoskeletal: Negative for joint swelling, myalgias and neck pain.   Skin: Negative for color change, rash and wound.   Neurological: Negative for dizziness, seizures, syncope, light-headedness, numbness and headaches.   Hematological: Does not bruise/bleed easily.         Objective:     There were no vitals taken for this visit.  Height of 5 feet 4 inch weight 210 pounds with a body mass index of 18 with a blood pressure 128/78 pulse of 72 oxygen saturation of 99 respiration of 18.  Physical Exam   Constitutional: She is oriented to person, place, and time. She appears well-developed and well-nourished.   HENT:   Head: Normocephalic and atraumatic.   Left Ear: External ear normal.   Nose: Nose normal.   Mouth/Throat: Oropharynx is clear and moist.   Eyes: Pupils are equal, round, and reactive to light. Conjunctivae and EOM are normal. No scleral icterus.   Neck: Normal range of motion. Neck supple. No JVD present. No tracheal deviation present. No thyromegaly present.   Cardiovascular: Normal rate and regular rhythm.   Pulmonary/Chest: Breath sounds normal. No stridor.   Abdominal: Bowel sounds are normal.   Musculoskeletal: Normal range of motion.   Lymphadenopathy:     She has no cervical adenopathy.   Neurological: She is alert and oriented to person, place, and time. She has normal reflexes.   Skin: Skin is warm and dry.   Psychiatric: She has a normal mood and affect. Her behavior is normal. Judgment and thought content normal.       Lab Review:           Invalid input(s): LABALBU, PROT                                    EKG:   ECG/EMG Results (last 24 hours)     ** No results found for the last 24 hours. **           Imaging:  Imaging Results (Last 24 Hours)     ** No results found for the last 24 hours. **          I personally viewed and interpreted the patient's EKG/Telemetry data.    Assessment:   1.  Chest pain.  2.  Symptomatic palpitation with shortness of breath.  3.  Hyperlipidemia.  4.  Strong family history of coronary artery disease        Plan:   1.  Chest pain.  Patient has symptoms of chest pain with symptomatic palpitation describes as a fluttering in the chest with shortness of breath.  Patient has had on and off symptoms.  Patient has had complete evaluation in the past in 2013 with neurology including an MRI with subsequent cardiac evaluation with a had negative head up tilt table testing and Holter monitor and a stress test evaluation.  Patient now presents with recurrent symptoms with risk factors being hyperlipidemia and family history for coronary artery disease.  Patient at the present time has been recommended to undergo a regular exercise stress test to rule out stress-induced ischemia and stress-induced arrhythmia.  Risk-benefit treatment options for the regular exercise stress test were discussed with the patient and an informed consent was obtained.  Plan was to proceed with a regular exercise stress test on 6/29/2020.    2.  Hyperlipidemia.  Patient has been counseled on low-fat low-cholesterol diet and to continue with the present dose of the pravastatin.    3.  Symptomatic palpitation.  Patient has had episodes of low heart rate at home.  Patient heart rate today 72 bpm.  Patient has been recommended magnesium oxide 4 mg twice a day.  Patient blood pressure is 128/78.  Have discussed with the patient possible need for a beta-blocker.  As the patient has had episodes of slow heart rate of 40 bpm at the present time would not start the patient on beta-blocker.    4.  History of vitamin D deficiency.  Patient has been on vitamin D supplement in the past.    5.  Symptoms of lightheaded  dizziness.  Patient had undergone previous tilt table testing which was negative patient underwent a Holter monitor in 2017 which was negative for any evidence of tachycardia or bradycardia arrhythmia.  Patient at the present time would be subjected to a regular exercise stress test to rule out exercise-induced tachybradycardia syndrome.    6.  Strong family history of atherosclerotic coronary artery disease is noted.    The above plan of management were discussed with the patient.      Time: time spent in face-to-face evaluation of greater than 55 minutes and interacting and formulating examining and discussing the plan with the patient with 50% of greater time spent in face-to-face interaction.    Soham Zhu MD  06/29/20  04:40      Dictated utilizing Dragon dictation.

## 2020-07-01 ENCOUNTER — OFFICE VISIT (OUTPATIENT)
Dept: FAMILY MEDICINE CLINIC | Facility: CLINIC | Age: 40
End: 2020-07-01

## 2020-07-01 VITALS
TEMPERATURE: 98.7 F | HEIGHT: 64 IN | SYSTOLIC BLOOD PRESSURE: 110 MMHG | BODY MASS INDEX: 19.07 KG/M2 | WEIGHT: 111.7 LBS | DIASTOLIC BLOOD PRESSURE: 78 MMHG

## 2020-07-01 DIAGNOSIS — R10.2 PELVIC PAIN: Primary | ICD-10-CM

## 2020-07-01 LAB
BILIRUB BLD-MCNC: NEGATIVE MG/DL
CLARITY, POC: CLEAR
COLOR UR: ABNORMAL
GLUCOSE UR STRIP-MCNC: NEGATIVE MG/DL
KETONES UR QL: NEGATIVE
LEUKOCYTE EST, POC: NEGATIVE
NITRITE UR-MCNC: NEGATIVE MG/ML
PH UR: 6 [PH] (ref 5–8)
PROT UR STRIP-MCNC: NEGATIVE MG/DL
RBC # UR STRIP: NEGATIVE /UL
SP GR UR: 1 (ref 1–1.03)
UROBILINOGEN UR QL: NORMAL

## 2020-07-01 PROCEDURE — 99214 OFFICE O/P EST MOD 30 MIN: CPT | Performed by: NURSE PRACTITIONER

## 2020-07-01 PROCEDURE — 81002 URINALYSIS NONAUTO W/O SCOPE: CPT | Performed by: NURSE PRACTITIONER

## 2020-07-01 NOTE — PROGRESS NOTES
Chief Complaint   Patient presents with   • Abdominal Pain     left side of abdomen , not constant     Subjective   Kathy Iesha Munoz is a 39 y.o. female.     Abdominal Pain   This is a new problem. The current episode started 1 to 4 weeks ago. The onset quality is sudden. The problem occurs daily. The problem has been waxing and waning. The pain is located in the LLQ. The pain is at a severity of 3/10. The pain is mild. The quality of the pain is dull. The abdominal pain radiates to the back and pelvis. Associated symptoms include constipation and nausea. Pertinent negatives include no anorexia, arthralgias, belching, diarrhea, dysuria, fever, flatus, frequency, headaches, hematochezia, hematuria, melena, myalgias, vomiting or weight loss. The pain is aggravated by certain positions. The pain is relieved by being still. She has tried acetaminophen for the symptoms. The treatment provided mild relief. There is no history of abdominal surgery, colon cancer, Crohn's disease, gallstones, GERD, irritable bowel syndrome, pancreatitis, PUD or ulcerative colitis.        The following portions of the patient's history were reviewed and updated as appropriate: allergies, current medications, past social history and problem list.    Review of Systems   Constitutional: Negative.  Negative for fever and weight loss.   Eyes: Negative.    Cardiovascular: Negative for chest pain, palpitations and leg swelling.   Gastrointestinal: Positive for abdominal pain, constipation and nausea. Negative for anal bleeding, anorexia, diarrhea, flatus, hematochezia, melena and vomiting.   Endocrine: Negative.    Genitourinary: Positive for pelvic pain. Negative for difficulty urinating, dyspareunia, dysuria, enuresis, flank pain, frequency, genital sores, hematuria and menstrual problem.   Musculoskeletal: Negative.  Negative for arthralgias, back pain and myalgias.   Neurological: Negative for dizziness, facial asymmetry and headaches.  "  Hematological: Negative.  Negative for adenopathy. Does not bruise/bleed easily.   Psychiatric/Behavioral: Negative.        Objective   /78   Temp 98.7 °F (37.1 °C) (Tympanic)   Ht 162.6 cm (64\")   Wt 50.7 kg (111 lb 11.2 oz)   BMI 19.17 kg/m²   Physical Exam   Constitutional: She is oriented to person, place, and time. She appears well-developed and well-nourished. No distress.   HENT:   Head: Normocephalic and atraumatic.   Right Ear: External ear normal.   Left Ear: External ear normal.   Mouth/Throat: Oropharynx is clear and moist. No oropharyngeal exudate.   Eyes: Pupils are equal, round, and reactive to light. EOM are normal. Right eye exhibits no discharge. Left eye exhibits no discharge. No scleral icterus.   Neck: Normal range of motion. Neck supple. No JVD present. No tracheal deviation present. No thyromegaly present.   Cardiovascular: Normal rate, regular rhythm, normal heart sounds and intact distal pulses. Exam reveals no gallop and no friction rub.   No murmur heard.  Pulmonary/Chest: Effort normal and breath sounds normal. No stridor. No respiratory distress. She has no wheezes. She has no rales. She exhibits no tenderness.   Implants bilaterally    Abdominal: Soft. Bowel sounds are normal. She exhibits no distension and no mass. There is no hepatosplenomegaly, splenomegaly or hepatomegaly. There is tenderness in the left lower quadrant. There is no rigidity, no rebound, no guarding, no CVA tenderness, no tenderness at McBurney's point and negative Isabel's sign. No hernia.       Musculoskeletal: Normal range of motion. She exhibits no edema, tenderness or deformity.   Lymphadenopathy:     She has no cervical adenopathy.   Neurological: She is alert and oriented to person, place, and time. She displays normal reflexes. No cranial nerve deficit or sensory deficit. She exhibits normal muscle tone. Coordination normal.   Skin: Skin is warm and dry. No rash noted. She is not diaphoretic. No " erythema. No pallor.   Nursing note and vitals reviewed.      Assessment/Plan   Problem List Items Addressed This Visit        Nervous and Auditory    Pelvic pain - Primary    Relevant Orders    US Non-ob Transvaginal    POCT urinalysis dipstick, manual (Completed)         No orders of the defined types were placed in this encounter.     us of pelvis as directed, if this is normal then consider colonoscopy -patient agrees with plan of action   Answers for HPI/ROS submitted by the patient on 6/22/2020   Abdominal pain  What is the primary reason for your visit?: Abdominal Pain

## 2020-07-31 DIAGNOSIS — L65.9 HAIR LOSS: ICD-10-CM

## 2020-07-31 DIAGNOSIS — R53.81 MALAISE: Primary | ICD-10-CM

## 2020-08-03 ENCOUNTER — LAB (OUTPATIENT)
Dept: LAB | Facility: HOSPITAL | Age: 40
End: 2020-08-03

## 2020-08-03 DIAGNOSIS — R53.81 MALAISE AND FATIGUE: Primary | ICD-10-CM

## 2020-08-03 DIAGNOSIS — R53.83 MALAISE AND FATIGUE: Primary | ICD-10-CM

## 2020-08-03 LAB
25(OH)D3 SERPL-MCNC: 43.1 NG/ML (ref 30–100)
ALBUMIN SERPL-MCNC: 4.7 G/DL (ref 3.5–5.2)
ALBUMIN/GLOB SERPL: 1.8 G/DL
ALP SERPL-CCNC: 46 U/L (ref 39–117)
ALT SERPL W P-5'-P-CCNC: 12 U/L (ref 1–33)
ANION GAP SERPL CALCULATED.3IONS-SCNC: 7.5 MMOL/L (ref 5–15)
AST SERPL-CCNC: 16 U/L (ref 1–32)
BASOPHILS # BLD AUTO: 0.05 10*3/MM3 (ref 0–0.2)
BASOPHILS NFR BLD AUTO: 0.8 % (ref 0–1.5)
BILIRUB SERPL-MCNC: 0.4 MG/DL (ref 0–1.2)
BUN SERPL-MCNC: 16 MG/DL (ref 6–20)
BUN/CREAT SERPL: 22.5 (ref 7–25)
CALCIUM SPEC-SCNC: 9.7 MG/DL (ref 8.6–10.5)
CHLORIDE SERPL-SCNC: 103 MMOL/L (ref 98–107)
CO2 SERPL-SCNC: 25.5 MMOL/L (ref 22–29)
CREAT SERPL-MCNC: 0.71 MG/DL (ref 0.57–1)
DEPRECATED RDW RBC AUTO: 42.1 FL (ref 37–54)
EOSINOPHIL # BLD AUTO: 0.12 10*3/MM3 (ref 0–0.4)
EOSINOPHIL NFR BLD AUTO: 1.9 % (ref 0.3–6.2)
ERYTHROCYTE [DISTWIDTH] IN BLOOD BY AUTOMATED COUNT: 13.1 % (ref 12.3–15.4)
ESTRADIOL SERPL HS-MCNC: 150 PG/ML
GFR SERPL CREATININE-BSD FRML MDRD: 91 ML/MIN/1.73
GLOBULIN UR ELPH-MCNC: 2.6 GM/DL
GLUCOSE SERPL-MCNC: 80 MG/DL (ref 65–99)
HCT VFR BLD AUTO: 40.1 % (ref 34–46.6)
HGB BLD-MCNC: 13.7 G/DL (ref 12–15.9)
IMM GRANULOCYTES # BLD AUTO: 0.01 10*3/MM3 (ref 0–0.05)
IMM GRANULOCYTES NFR BLD AUTO: 0.2 % (ref 0–0.5)
IRON 24H UR-MRATE: 86 MCG/DL (ref 37–145)
LYMPHOCYTES # BLD AUTO: 2.54 10*3/MM3 (ref 0.7–3.1)
LYMPHOCYTES NFR BLD AUTO: 40.7 % (ref 19.6–45.3)
MAGNESIUM SERPL-MCNC: 2.4 MG/DL (ref 1.6–2.6)
MCH RBC QN AUTO: 30 PG (ref 26.6–33)
MCHC RBC AUTO-ENTMCNC: 34.2 G/DL (ref 31.5–35.7)
MCV RBC AUTO: 87.9 FL (ref 79–97)
MONOCYTES # BLD AUTO: 0.42 10*3/MM3 (ref 0.1–0.9)
MONOCYTES NFR BLD AUTO: 6.7 % (ref 5–12)
NEUTROPHILS NFR BLD AUTO: 3.1 10*3/MM3 (ref 1.7–7)
NEUTROPHILS NFR BLD AUTO: 49.7 % (ref 42.7–76)
NRBC BLD AUTO-RTO: 0 /100 WBC (ref 0–0.2)
PLATELET # BLD AUTO: 321 10*3/MM3 (ref 140–450)
PMV BLD AUTO: 10.8 FL (ref 6–12)
POTASSIUM SERPL-SCNC: 3.7 MMOL/L (ref 3.5–5.2)
PROGEST SERPL-MCNC: 3.77 NG/ML
PROT SERPL-MCNC: 7.3 G/DL (ref 6–8.5)
RBC # BLD AUTO: 4.56 10*6/MM3 (ref 3.77–5.28)
SODIUM SERPL-SCNC: 136 MMOL/L (ref 136–145)
TSH SERPL DL<=0.05 MIU/L-ACNC: 4.1 UIU/ML (ref 0.27–4.2)
VIT B12 BLD-MCNC: 314 PG/ML (ref 211–946)
WBC # BLD AUTO: 6.24 10*3/MM3 (ref 3.4–10.8)

## 2020-08-03 PROCEDURE — 80053 COMPREHEN METABOLIC PANEL: CPT | Performed by: NURSE PRACTITIONER

## 2020-08-03 PROCEDURE — 82607 VITAMIN B-12: CPT | Performed by: NURSE PRACTITIONER

## 2020-08-03 PROCEDURE — 84443 ASSAY THYROID STIM HORMONE: CPT | Performed by: NURSE PRACTITIONER

## 2020-08-03 PROCEDURE — 36415 COLL VENOUS BLD VENIPUNCTURE: CPT | Performed by: NURSE PRACTITIONER

## 2020-08-03 PROCEDURE — 84144 ASSAY OF PROGESTERONE: CPT | Performed by: NURSE PRACTITIONER

## 2020-08-03 PROCEDURE — 84403 ASSAY OF TOTAL TESTOSTERONE: CPT | Performed by: NURSE PRACTITIONER

## 2020-08-03 PROCEDURE — 86769 SARS-COV-2 COVID-19 ANTIBODY: CPT | Performed by: NURSE PRACTITIONER

## 2020-08-03 PROCEDURE — 83540 ASSAY OF IRON: CPT | Performed by: NURSE PRACTITIONER

## 2020-08-03 PROCEDURE — 85025 COMPLETE CBC W/AUTO DIFF WBC: CPT | Performed by: NURSE PRACTITIONER

## 2020-08-03 PROCEDURE — 82670 ASSAY OF TOTAL ESTRADIOL: CPT | Performed by: NURSE PRACTITIONER

## 2020-08-03 PROCEDURE — 83735 ASSAY OF MAGNESIUM: CPT | Performed by: NURSE PRACTITIONER

## 2020-08-03 PROCEDURE — 82306 VITAMIN D 25 HYDROXY: CPT | Performed by: NURSE PRACTITIONER

## 2020-08-03 PROCEDURE — 84402 ASSAY OF FREE TESTOSTERONE: CPT | Performed by: NURSE PRACTITIONER

## 2020-08-05 DIAGNOSIS — R79.89 ABNORMAL TSH: Primary | ICD-10-CM

## 2020-08-05 LAB — SARS-COV-2 AB SERPL QL IA: NEGATIVE

## 2020-08-06 ENCOUNTER — LAB (OUTPATIENT)
Dept: LAB | Facility: HOSPITAL | Age: 40
End: 2020-08-06

## 2020-08-06 LAB
TESTOST FREE MFR SERPL: 0.86 % (ref 0.5–2.8)
TESTOST FREE SERPL-MCNC: 0.31 NG/DL (ref 0.1–0.85)
TESTOST SERPL-MCNC: 36.5 NG/DL

## 2020-08-06 PROCEDURE — 36415 COLL VENOUS BLD VENIPUNCTURE: CPT | Performed by: NURSE PRACTITIONER

## 2020-08-06 PROCEDURE — 84445 ASSAY OF TSI GLOBULIN: CPT | Performed by: NURSE PRACTITIONER

## 2020-08-06 PROCEDURE — 84480 ASSAY TRIIODOTHYRONINE (T3): CPT | Performed by: NURSE PRACTITIONER

## 2020-08-06 PROCEDURE — 84439 ASSAY OF FREE THYROXINE: CPT | Performed by: NURSE PRACTITIONER

## 2020-08-07 DIAGNOSIS — R53.83 MALAISE AND FATIGUE: Primary | ICD-10-CM

## 2020-08-07 DIAGNOSIS — R53.81 MALAISE AND FATIGUE: Primary | ICD-10-CM

## 2020-08-07 DIAGNOSIS — E03.9 HYPOTHYROIDISM (ACQUIRED): Primary | ICD-10-CM

## 2020-08-07 LAB
T3 SERPL-MCNC: 101 NG/DL (ref 80–200)
T4 FREE SERPL-MCNC: 1.15 NG/DL (ref 0.93–1.7)

## 2020-08-10 LAB — TSI SER-MCNC: <0.1 IU/L (ref 0–0.55)

## 2020-09-24 DIAGNOSIS — E78.5 HYPERLIPIDEMIA, UNSPECIFIED HYPERLIPIDEMIA TYPE: ICD-10-CM

## 2020-09-24 DIAGNOSIS — R53.81 MALAISE: Primary | ICD-10-CM

## 2020-09-30 ENCOUNTER — LAB (OUTPATIENT)
Dept: LAB | Facility: HOSPITAL | Age: 40
End: 2020-09-30

## 2020-09-30 DIAGNOSIS — R79.89 ABNORMAL THYROID BLOOD TEST: Primary | ICD-10-CM

## 2020-09-30 LAB
CHOLEST SERPL-MCNC: 178 MG/DL (ref 0–200)
HDLC SERPL-MCNC: 55 MG/DL (ref 40–60)
LDLC SERPL CALC-MCNC: 106 MG/DL (ref 0–100)
LDLC/HDLC SERPL: 1.93 {RATIO}
TRIGL SERPL-MCNC: 85 MG/DL (ref 0–150)
TSH SERPL DL<=0.05 MIU/L-ACNC: 4.85 UIU/ML (ref 0.27–4.2)
VLDLC SERPL-MCNC: 17 MG/DL (ref 5–40)

## 2020-09-30 PROCEDURE — 84443 ASSAY THYROID STIM HORMONE: CPT | Performed by: NURSE PRACTITIONER

## 2020-09-30 PROCEDURE — 36415 COLL VENOUS BLD VENIPUNCTURE: CPT | Performed by: NURSE PRACTITIONER

## 2020-09-30 PROCEDURE — 80061 LIPID PANEL: CPT | Performed by: NURSE PRACTITIONER

## 2020-10-15 DIAGNOSIS — Z12.31 VISIT FOR SCREENING MAMMOGRAM: Primary | ICD-10-CM

## 2020-10-29 DIAGNOSIS — Z89.622: Primary | ICD-10-CM

## 2020-10-29 DIAGNOSIS — M54.50 MIDLINE LOW BACK PAIN WITHOUT SCIATICA, UNSPECIFIED CHRONICITY: ICD-10-CM

## 2020-10-29 DIAGNOSIS — R10.30 LOWER ABDOMINAL PAIN: ICD-10-CM

## 2020-10-30 ENCOUNTER — TELEMEDICINE (OUTPATIENT)
Dept: FAMILY MEDICINE CLINIC | Facility: CLINIC | Age: 40
End: 2020-10-30

## 2020-10-30 DIAGNOSIS — R10.32 LEFT LOWER QUADRANT ABDOMINAL PAIN: Primary | ICD-10-CM

## 2020-10-30 DIAGNOSIS — F41.9 ANXIETY: ICD-10-CM

## 2020-10-30 PROCEDURE — 99442 PR PHYS/QHP TELEPHONE EVALUATION 11-20 MIN: CPT | Performed by: NURSE PRACTITIONER

## 2020-10-30 RX ORDER — DIAZEPAM 5 MG/1
5 TABLET ORAL 2 TIMES DAILY PRN
Qty: 4 TABLET | Refills: 0 | Status: SHIPPED | OUTPATIENT
Start: 2020-10-30

## 2020-10-30 NOTE — PROGRESS NOTES
No chief complaint on file.    Subjective   Kathy Munoz is a 39 y.o. female.     Present with telephone visit-abdominal pain -lower pelvic abdominal pain and pressure -lower left pelvic, blood in urine on and off     Abdominal Pain  This is a new problem. The current episode started more than 1 month ago. The onset quality is gradual. The problem occurs daily. The problem has been gradually worsening. The pain is located in the LUQ and LLQ. The pain is at a severity of 10/10. The pain is severe. The quality of the pain is dull. The abdominal pain radiates to the LUQ and LLQ. Associated symptoms include arthralgias, constipation, frequency and hematuria. Pertinent negatives include no anorexia, belching, diarrhea, dysuria, fever, flatus, headaches, hematochezia, melena, myalgias, nausea, vomiting or weight loss. The pain is aggravated by certain positions and movement. The pain is relieved by eating and being still. The treatment provided no relief. Prior diagnostic workup includes GI consult (scheduled for colonoscopy in 2 weeks ). There is no history of abdominal surgery, colon cancer, Crohn's disease, gallstones, GERD, irritable bowel syndrome, pancreatitis, PUD or ulcerative colitis.        The following portions of the patient's history were reviewed and updated as appropriate: allergies, current medications, past social history and problem list.    Review of Systems   Constitutional: Negative.  Negative for activity change, appetite change, chills, diaphoresis, fatigue, fever, unexpected weight change and weight loss.   HENT: Positive for rhinorrhea. Negative for congestion, dental problem, ear pain, facial swelling, mouth sores, nosebleeds, postnasal drip, sinus pressure, sinus pain, sneezing and sore throat.    Eyes: Negative.  Negative for photophobia, pain, discharge, redness, itching and visual disturbance.   Respiratory: Negative.  Negative for apnea, cough, chest tightness, shortness of breath,  wheezing and stridor.    Cardiovascular: Negative.  Negative for chest pain, palpitations and leg swelling.   Gastrointestinal: Positive for abdominal distention, abdominal pain and constipation. Negative for anal bleeding, anorexia, blood in stool, diarrhea, flatus, hematochezia, melena, nausea, rectal pain and vomiting.        Left lower abdominal pain with walking, bending , straining    Endocrine: Negative.  Negative for cold intolerance, heat intolerance, polydipsia, polyphagia and polyuria.   Genitourinary: Positive for frequency, hematuria and pelvic pain. Negative for difficulty urinating, dyspareunia and dysuria.   Musculoskeletal: Positive for arthralgias, back pain, gait problem and joint swelling. Negative for myalgias, neck pain and neck stiffness.   Skin: Negative.  Negative for color change, pallor and rash.   Allergic/Immunologic: Negative.  Negative for environmental allergies, food allergies and immunocompromised state.   Neurological: Negative for dizziness, tremors, seizures, facial asymmetry, weakness, light-headedness, numbness and headaches.   Hematological: Negative.  Negative for adenopathy. Does not bruise/bleed easily.   Psychiatric/Behavioral: Negative.  Negative for agitation, behavioral problems, confusion, decreased concentration, dysphoric mood, hallucinations, self-injury, sleep disturbance and suicidal ideas. The patient is not nervous/anxious and is not hyperactive.        Objective   [facility] 10/11/2020   Physical Exam  Vitals signs and nursing note reviewed.   Constitutional:       Comments: Reports lower abdominal tenderness left sided tender to touch no falls or injuries    Neurological:      Mental Status: She is alert.         Assessment/Plan   Problems Addressed this Visit     None      Visit Diagnoses     Left lower quadrant abdominal pain    -  Primary    Relevant Orders    CT Abdomen Pelvis Without Contrast    Anxiety        Relevant Medications    diazePAM (Valium) 5 MG  tablet      Diagnoses       Codes Comments    Left lower quadrant abdominal pain    -  Primary ICD-10-CM: R10.32  ICD-9-CM: 789.04     Anxiety     ICD-10-CM: F41.9  ICD-9-CM: 300.00            New Medications Ordered This Visit   Medications   • diazePAM (Valium) 5 MG tablet     Sig: Take 1 tablet by mouth 2 (Two) Times a Day As Needed for Anxiety.     Dispense:  4 tablet     Refill:  0      ct of abdomen as directed, no fast foods or fried foods, use valium before procedure due to anxiety     It's not just what you , but when you eat  Eat breakfast, aeatnd eat smaller meals throughout the day. A healthy breakfast can jumpstart your metabolism, while eating small, healthy meals (rather than the standard three large meals) keeps your energy up.   Avoid eating at night. Try to eat dinner earlier and fast for 14-16 hours until breakfast the next morning. Studies suggest that eating only when you’re most active and giving your digestive system a long break each day may help to regulate weight.     Ct of abdomen -meds as directed -er if worsen, keep appointment for colonoscopy as directed     You have chosen to receive care through a telephone visit. Do you consent to use a telephone visit for your medical care today? Yes    This visit has been rescheduled as a phone visit to comply with patient safety concerns in accordance with CDC recommendations. Total time of discussion was 12 minutes.      meds as directed, follow up if worsen

## 2020-11-10 ENCOUNTER — LAB (OUTPATIENT)
Dept: LAB | Facility: HOSPITAL | Age: 40
End: 2020-11-10

## 2020-11-10 DIAGNOSIS — Z01.818 PREOP TESTING: Primary | ICD-10-CM

## 2020-11-10 PROCEDURE — C9803 HOPD COVID-19 SPEC COLLECT: HCPCS

## 2020-11-10 PROCEDURE — U0003 INFECTIOUS AGENT DETECTION BY NUCLEIC ACID (DNA OR RNA); SEVERE ACUTE RESPIRATORY SYNDROME CORONAVIRUS 2 (SARS-COV-2) (CORONAVIRUS DISEASE [COVID-19]), AMPLIFIED PROBE TECHNIQUE, MAKING USE OF HIGH THROUGHPUT TECHNOLOGIES AS DESCRIBED BY CMS-2020-01-R: HCPCS

## 2020-11-11 LAB
COVID LABCORP PRIORITY: NORMAL
SARS-COV-2 RNA RESP QL NAA+PROBE: NOT DETECTED

## 2020-11-13 ENCOUNTER — ANESTHESIA EVENT (OUTPATIENT)
Dept: GASTROENTEROLOGY | Facility: HOSPITAL | Age: 40
End: 2020-11-13

## 2020-11-13 ENCOUNTER — HOSPITAL ENCOUNTER (OUTPATIENT)
Facility: HOSPITAL | Age: 40
Setting detail: HOSPITAL OUTPATIENT SURGERY
Discharge: HOME | End: 2020-11-13
Attending: INTERNAL MEDICINE | Admitting: INTERNAL MEDICINE

## 2020-11-13 ENCOUNTER — ANESTHESIA (OUTPATIENT)
Dept: GASTROENTEROLOGY | Facility: HOSPITAL | Age: 40
End: 2020-11-13

## 2020-11-13 VITALS
HEART RATE: 93 BPM | WEIGHT: 111 LBS | HEIGHT: 63 IN | OXYGEN SATURATION: 98 % | RESPIRATION RATE: 18 BRPM | TEMPERATURE: 98.3 F | DIASTOLIC BLOOD PRESSURE: 58 MMHG | BODY MASS INDEX: 19.67 KG/M2 | SYSTOLIC BLOOD PRESSURE: 99 MMHG

## 2020-11-13 PROCEDURE — 25010000002 MIDAZOLAM PER 1 MG: Performed by: NURSE ANESTHETIST, CERTIFIED REGISTERED

## 2020-11-13 PROCEDURE — 25010000002 PROPOFOL 10 MG/ML EMULSION: Performed by: NURSE ANESTHETIST, CERTIFIED REGISTERED

## 2020-11-13 RX ORDER — LIDOCAINE HYDROCHLORIDE 20 MG/ML
INJECTION, SOLUTION EPIDURAL; INFILTRATION; INTRACAUDAL; PERINEURAL AS NEEDED
Status: DISCONTINUED | OUTPATIENT
Start: 2020-11-13 | End: 2020-11-13 | Stop reason: SURG

## 2020-11-13 RX ORDER — ONDANSETRON 2 MG/ML
4 INJECTION INTRAMUSCULAR; INTRAVENOUS ONCE AS NEEDED
Status: DISCONTINUED | OUTPATIENT
Start: 2020-11-13 | End: 2020-11-13 | Stop reason: HOSPADM

## 2020-11-13 RX ORDER — PROPOFOL 10 MG/ML
VIAL (ML) INTRAVENOUS AS NEEDED
Status: DISCONTINUED | OUTPATIENT
Start: 2020-11-13 | End: 2020-11-13 | Stop reason: SURG

## 2020-11-13 RX ORDER — DEXTROSE AND SODIUM CHLORIDE 5; .45 G/100ML; G/100ML
30 INJECTION, SOLUTION INTRAVENOUS CONTINUOUS PRN
Status: DISCONTINUED | OUTPATIENT
Start: 2020-11-13 | End: 2020-11-13 | Stop reason: HOSPADM

## 2020-11-13 RX ORDER — PROMETHAZINE HYDROCHLORIDE 25 MG/1
25 SUPPOSITORY RECTAL ONCE AS NEEDED
Status: DISCONTINUED | OUTPATIENT
Start: 2020-11-13 | End: 2020-11-13 | Stop reason: HOSPADM

## 2020-11-13 RX ORDER — MEPERIDINE HYDROCHLORIDE 25 MG/ML
12.5 INJECTION INTRAMUSCULAR; INTRAVENOUS; SUBCUTANEOUS
Status: DISCONTINUED | OUTPATIENT
Start: 2020-11-13 | End: 2020-11-13 | Stop reason: HOSPADM

## 2020-11-13 RX ORDER — KETAMINE HYDROCHLORIDE 100 MG/ML
INJECTION INTRAMUSCULAR; INTRAVENOUS AS NEEDED
Status: DISCONTINUED | OUTPATIENT
Start: 2020-11-13 | End: 2020-11-13 | Stop reason: SURG

## 2020-11-13 RX ORDER — MIDAZOLAM HYDROCHLORIDE 1 MG/ML
INJECTION INTRAMUSCULAR; INTRAVENOUS AS NEEDED
Status: DISCONTINUED | OUTPATIENT
Start: 2020-11-13 | End: 2020-11-13 | Stop reason: SURG

## 2020-11-13 RX ORDER — PROMETHAZINE HYDROCHLORIDE 25 MG/1
25 TABLET ORAL ONCE AS NEEDED
Status: DISCONTINUED | OUTPATIENT
Start: 2020-11-13 | End: 2020-11-13 | Stop reason: HOSPADM

## 2020-11-13 RX ADMIN — MIDAZOLAM HYDROCHLORIDE 2 MG: 2 INJECTION, SOLUTION INTRAMUSCULAR; INTRAVENOUS at 14:36

## 2020-11-13 RX ADMIN — PROPOFOL 100 MG: 10 INJECTION, EMULSION INTRAVENOUS at 14:52

## 2020-11-13 RX ADMIN — PROPOFOL 100 MG: 10 INJECTION, EMULSION INTRAVENOUS at 14:40

## 2020-11-13 RX ADMIN — KETAMINE HYDROCHLORIDE 10 MG: 100 INJECTION INTRAMUSCULAR; INTRAVENOUS at 14:40

## 2020-11-13 RX ADMIN — DEXTROSE AND SODIUM CHLORIDE 30 ML/HR: 5; 450 INJECTION, SOLUTION INTRAVENOUS at 14:09

## 2020-11-13 RX ADMIN — LIDOCAINE HYDROCHLORIDE 50 MG: 20 INJECTION, SOLUTION EPIDURAL; INFILTRATION; INTRACAUDAL; PERINEURAL at 14:40

## 2020-11-13 NOTE — ANESTHESIA PREPROCEDURE EVALUATION
Anesthesia Evaluation     Patient summary reviewed and Nursing notes reviewed                Airway   No difficulty expected  Dental      Pulmonary - negative pulmonary ROS and normal exam   Cardiovascular - normal exam    (+) hyperlipidemia,       Neuro/Psych  (+) dizziness/light headedness,     GI/Hepatic/Renal/Endo - negative ROS     Musculoskeletal (-) negative ROS    Abdominal  - normal exam   Substance History - negative use     OB/GYN negative ob/gyn ROS         Other - negative ROS                       Anesthesia Plan    ASA 2     MAC     intravenous induction     Anesthetic plan, all risks, benefits, and alternatives have been provided, discussed and informed consent has been obtained with: patient.    Plan discussed with CRNA.

## 2020-11-13 NOTE — ANESTHESIA POSTPROCEDURE EVALUATION
Patient: Kathy Munoz    Procedure Summary     Date: 11/13/20 Room / Location: [facility] ENDOSCOPY 2 / [facility] ENDOSCOPY    Anesthesia Start: 1436 Anesthesia Stop: 1457    Procedure: COLONOSCOPY (N/A ) Diagnosis:       Irritable bowel syndrome      (Irritable bowel syndrome [K58.9])    Surgeon: Damaso Delgado DO Provider: Ender Viera CRNA    Anesthesia Type: MAC ASA Status: 2          Anesthesia Type: MAC    Vitals  No vitals data found for the desired time range.          Post Anesthesia Care and Evaluation    Patient location during evaluation: PHASE II  Patient participation: complete - patient participated  Level of consciousness: awake and alert  Pain score: 1  Pain management: adequate  Airway patency: patent  Anesthetic complications: No anesthetic complications  PONV Status: none  Cardiovascular status: acceptable  Respiratory status: acceptable  Hydration status: acceptable  Post Neuraxial Block status: Motor and sensory function returned to baseline

## 2020-11-13 NOTE — H&P
Damaso Stephens DO,[facility]  Gastroenterology  Hepatology  Endoscopy  Board Certified in Internal Medicine and gastroenterology  [address], [address]  [place]. [address]  - [phone]   F - [phone]     GASTROENTEROLOGY HISTORY AND PHYSICAL  NOTE   DAMASO STEPHENS DO.         SUBJECTIVE:   11/13/2020    Name: Kathy Munoz  DOD: 1980        Chief Complaint:       Subjective : Screen for colon cancer.  Family history of colon cancer    Patient is 39 y.o. female presents with desire for high risk for colon cancer screening.      ROS/HISTORY/ CURRENT MEDICATIONS/OBJECTIVE/VS/PE:   Review of Systems:  All systems unremarkable unless specified below.  Constitutional   HENT  Eyes   Respiratory    Cardiovascular  Gastrointestinal   Endocrine  Genitourinary    Musculoskeletal   Skin  Allergic/Immunologic    Neurological    Hematological  Psychiatric/Behavioral    History:     Past Medical History:   Diagnosis Date   • Hyperlipidemia      Past Surgical History:   Procedure Laterality Date   • AUGMENTATION MAMMAPLASTY     • TONSILLECTOMY       Family History   Problem Relation Age of Onset   • Breast cancer Paternal Grandmother      Social History     Tobacco Use   • Smoking status: Never Smoker   • Smokeless tobacco: Never Used   Substance Use Topics   • Alcohol use: Not on file     Comment: rarely    • Drug use: Never     Prior to Admission medications    Medication Sig Start Date End Date Taking? Authorizing Provider   linaclotide (LINZESS) 290 MCG capsule capsule Take 290 mcg by mouth Every Morning Before Breakfast.   Yes Emergency, Nurse Genevieve, RN   pravastatin (PRAVACHOL) 40 MG tablet Take 40 mg by mouth Daily.   Yes Emergency, Nurse Geenvieve, RN   diazePAM (Valium) 5 MG tablet Take 1 tablet by mouth 2 (Two) Times a Day As Needed for Anxiety. 10/30/20   Vickie Ellison APRN     Allergies:  Avelox [moxifloxacin] and Codeine    I have reviewed the patients medical history, surgical  history and family history in the available medical record system.     Current Medications:     Current Facility-Administered Medications   Medication Dose Route Frequency Provider Last Rate Last Dose   • dextrose 5 % and sodium chloride 0.45 % infusion  30 mL/hr Intravenous Continuous PRN Damaso Delgado DO 30 mL/hr at 11/13/20 1409 30 mL/hr at 11/13/20 1409       Objective     Physical Exam:   Temp:  [97.4 °F (36.3 °C)] 97.4 °F (36.3 °C)  Heart Rate:  [100] 100  Resp:  [20] 20  BP: (139)/(77) 139/77    Physical Exam:  General Appearance:    Alert, cooperative, in no acute distress   Head:    Normocephalic, without obvious abnormality, atraumatic   Eyes:            Lids and lashes normal, conjunctivae and sclerae normal, no   icterus, no pallor, corneas clear, PERRLA   Ears:    Ears appear intact with no abnormalities noted   Throat:   No oral lesions, no thrush, oral mucosa moist   Neck:   No adenopathy, supple, trachea midline, no thyromegaly, no     carotid bruit, no JVD   Back:     No kyphosis present, no scoliosis present, no skin lesions,       erythema or scars, no tenderness to percussion or                   palpation,   range of motion normal   Lungs:     Clear to auscultation,respirations regular, even and                   unlabored    Heart:    Regular rhythm and normal rate, normal S1 and S2, no            murmur, no gallop, no rub, no click   Breast Exam:    Deferred   Abdomen:     Normal bowel sounds, no masses, no organomegaly, soft        non-tender, non-distended, no guarding, no rebound                 tenderness   Genitalia:    Deferred   Extremities:   Moves all extremities well, no edema, no cyanosis, no              redness   Pulses:   Pulses palpable and equal bilaterally   Skin:   No bleeding, bruising or rash   Lymph nodes:   No palpable adenopathy   Neurologic:   Cranial nerves 2 - 12 grossly intact, sensation intact, DTR        present and equal bilaterally      Results Review:      Lab Results   Component Value Date    WBC 6.24 08/03/2020    WBC 5.08 09/10/2019    WBC 6.44 08/09/2018    HGB 13.7 08/03/2020    HGB 14.0 09/10/2019    HGB 13.8 08/09/2018    HCT 40.1 08/03/2020    HCT 43.0 09/10/2019    HCT 40.0 08/09/2018     08/03/2020     09/10/2019     08/09/2018             No results found for: LIPASE  No results found for: INR  No results found for: CULTURE    Radiology Review:  Imaging Results (Last 72 Hours)     ** No results found for the last 72 hours. **           I reviewed the patient's new clinical results.  I reviewed the patient's new imaging results and agree with the interpretation.     ASSESSMENT/PLAN:   ASSESSMENT:  1.  Screen for high risk for colon cancer  2.  Family history of colon cancer    PLAN:  1.  Colonoscopy    Risk and benefits associated with the procedure are reviewed with the patient.  The patient wished to proceed     Damaso Delgado DO  11/13/20  14:41 CST

## 2020-12-07 ENCOUNTER — APPOINTMENT (OUTPATIENT)
Dept: CT IMAGING | Facility: HOSPITAL | Age: 40
End: 2020-12-07

## 2020-12-16 DIAGNOSIS — R10.84 GENERALIZED ABDOMINAL PAIN: Primary | ICD-10-CM

## 2020-12-21 ENCOUNTER — APPOINTMENT (OUTPATIENT)
Dept: CT IMAGING | Facility: HOSPITAL | Age: 40
End: 2020-12-21

## 2020-12-30 ENCOUNTER — LAB (OUTPATIENT)
Dept: LAB | Facility: HOSPITAL | Age: 40
End: 2020-12-30

## 2020-12-30 PROCEDURE — 84480 ASSAY TRIIODOTHYRONINE (T3): CPT | Performed by: NURSE PRACTITIONER

## 2020-12-30 PROCEDURE — 36415 COLL VENOUS BLD VENIPUNCTURE: CPT | Performed by: NURSE PRACTITIONER

## 2020-12-30 PROCEDURE — 84439 ASSAY OF FREE THYROXINE: CPT | Performed by: NURSE PRACTITIONER

## 2020-12-30 PROCEDURE — 84443 ASSAY THYROID STIM HORMONE: CPT | Performed by: NURSE PRACTITIONER

## 2020-12-30 PROCEDURE — 86376 MICROSOMAL ANTIBODY EACH: CPT | Performed by: NURSE PRACTITIONER

## 2020-12-31 ENCOUNTER — TELEPHONE (OUTPATIENT)
Dept: FAMILY MEDICINE CLINIC | Facility: CLINIC | Age: 40
End: 2020-12-31

## 2020-12-31 LAB
T3 SERPL-MCNC: 110 NG/DL (ref 80–200)
T4 FREE SERPL-MCNC: 1.17 NG/DL (ref 0.93–1.7)
TSH SERPL DL<=0.05 MIU/L-ACNC: 2.61 UIU/ML (ref 0.27–4.2)

## 2020-12-31 NOTE — TELEPHONE ENCOUNTER
Per ROYER Poole, Ms. Munoz has been called with recent lab results & recommendations.  Continue current medications and follow-up as planned or sooner if any problems.        ----- Message from ROYER Small sent at 12/31/2020  6:03 AM CST -----  Regarding: FW:  Can you let her know her thyroid is good now  ----- Message -----  From: Lab, Background User  Sent: 12/31/2020   1:08 AM CST  To: ROYER Small

## 2021-01-01 LAB — THYROPEROXIDASE AB SERPL-ACNC: <9 IU/ML (ref 0–34)

## 2021-01-12 ENCOUNTER — APPOINTMENT (OUTPATIENT)
Dept: ULTRASOUND IMAGING | Facility: HOSPITAL | Age: 41
End: 2021-01-12

## 2021-02-25 ENCOUNTER — HOSPITAL ENCOUNTER (OUTPATIENT)
Dept: ULTRASOUND IMAGING | Facility: HOSPITAL | Age: 41
Discharge: HOME | End: 2021-02-25
Admitting: NURSE PRACTITIONER

## 2021-02-25 DIAGNOSIS — K82.8 GALLBLADDER SLUDGE: Primary | ICD-10-CM

## 2021-02-25 PROCEDURE — 76700 US EXAM ABDOM COMPLETE: CPT

## 2021-03-01 ENCOUNTER — OFFICE VISIT (OUTPATIENT)
Dept: SURGERY | Facility: CLINIC | Age: 41
End: 2021-03-01

## 2021-03-01 VITALS
SYSTOLIC BLOOD PRESSURE: 110 MMHG | HEIGHT: 63 IN | DIASTOLIC BLOOD PRESSURE: 78 MMHG | HEART RATE: 83 BPM | TEMPERATURE: 97.7 F | BODY MASS INDEX: 20.41 KG/M2 | WEIGHT: 115.2 LBS

## 2021-03-01 DIAGNOSIS — R10.32 LEFT LOWER QUADRANT ABDOMINAL PAIN: Primary | ICD-10-CM

## 2021-03-01 DIAGNOSIS — K82.4 GALLBLADDER POLYP: Primary | ICD-10-CM

## 2021-03-01 PROCEDURE — 99203 OFFICE O/P NEW LOW 30 MIN: CPT | Performed by: SURGERY

## 2021-03-01 NOTE — PROGRESS NOTES
CHIEF COMPLAINT:    Gallbladder sludge    HISTORY OF PRESENT ILLNESS:    Kathy Munoz is a 40 y.o. female who has a history of diverticulosis and intermittent left lower quadrant abdominal discomfort and IBS.  She recently underwent a gallbladder ultrasound as work-up for her abdominal complaints which showed possible sludge in the gallbladder as well as at least one polyp in the gallbladder wall.  The patient denies any postprandial right upper quadrant or epigastric abdominal pain.    Past Medical History:   Diagnosis Date   • Hyperlipidemia        Past Surgical History:   Procedure Laterality Date   • AUGMENTATION MAMMAPLASTY     • COLONOSCOPY N/A 11/13/2020    Procedure: COLONOSCOPY;  Surgeon: Damaso Delgado DO;  Location: [facility] ENDOSCOPY;  Service: Gastroenterology;  Laterality: N/A;   • TONSILLECTOMY         Prior to Admission medications    Medication Sig Start Date End Date Taking? Authorizing Provider   diazePAM (Valium) 5 MG tablet Take 1 tablet by mouth 2 (Two) Times a Day As Needed for Anxiety. 10/30/20  Yes Vickie Ellison APRN   linaclotide (LINZESS) 290 MCG capsule capsule Take 290 mcg by mouth Every Morning Before Breakfast.   Yes Emergency, Nurse Genevieve, RN   pravastatin (PRAVACHOL) 40 MG tablet Take 40 mg by mouth Daily.   Yes Emergency, Nurse Genevieve, RN       Allergies   Allergen Reactions   • Avelox [Moxifloxacin] Swelling     Throat swelling    • Codeine Nausea And Vomiting       Family History   Problem Relation Age of Onset   • Breast cancer Paternal Grandmother        Social History     Socioeconomic History   • Marital status:      Spouse name: Not on file   • Number of children: Not on file   • Years of education: Not on file   • Highest education level: Not on file   Tobacco Use   • Smoking status: Never Smoker   • Smokeless tobacco: Never Used   Substance and Sexual Activity   • Drug use: Never       Review of Systems   Constitutional: Negative for appetite change,  "chills, fever and unexpected weight change.   HENT: Negative for hearing loss, nosebleeds and trouble swallowing.    Eyes: Negative for visual disturbance.   Respiratory: Negative for apnea, cough, choking, chest tightness, shortness of breath, wheezing and stridor.    Cardiovascular: Negative for chest pain, palpitations and leg swelling.   Gastrointestinal: Positive for abdominal pain and constipation. Negative for abdominal distention, blood in stool, diarrhea, nausea and vomiting.   Endocrine: Negative for cold intolerance, heat intolerance, polydipsia, polyphagia and polyuria.   Genitourinary: Negative for difficulty urinating, dysuria, frequency, hematuria and urgency.   Musculoskeletal: Negative for arthralgias, back pain, myalgias and neck pain.   Skin: Negative for color change, pallor and rash.   Allergic/Immunologic: Negative for immunocompromised state.   Neurological: Negative for dizziness, seizures, syncope, light-headedness, numbness and headaches.   Hematological: Negative for adenopathy.   Psychiatric/Behavioral: Negative for suicidal ideas. The patient is not nervous/anxious.        Objective     /78   Pulse 83   Temp 97.7 °F (36.5 °C) (Temporal)   Ht 160 cm (63\")   Wt 52.3 kg (115 lb 3.2 oz)   BMI 20.41 kg/m²     Physical Exam  Vitals signs reviewed.   Constitutional:       Appearance: Normal appearance. She is normal weight.   HENT:      Head: Normocephalic and atraumatic.   Eyes:      General: No scleral icterus.        Right eye: No discharge.         Left eye: No discharge.      Extraocular Movements: Extraocular movements intact.      Conjunctiva/sclera: Conjunctivae normal.   Cardiovascular:      Rate and Rhythm: Normal rate.   Pulmonary:      Effort: Pulmonary effort is normal. No respiratory distress.   Abdominal:      General: There is no distension.      Palpations: Abdomen is soft. There is no mass.      Tenderness: There is no abdominal tenderness. There is no guarding or " rebound.      Hernia: No hernia is present.   Skin:     General: Skin is warm.   Neurological:      General: No focal deficit present.      Mental Status: She is alert and oriented to person, place, and time.   Psychiatric:         Mood and Affect: Mood normal.         Behavior: Behavior normal.         Thought Content: Thought content normal.         Judgment: Judgment normal.         DIAGNOSTIC DATA:    Images and report from right upper quadrant ultrasound reviewed showing at least 1 polyp in the mid body of the gallbladder which was less than a centimeter in diameter, possible sludge in the gallbladder lumen    ASSESSMENT:    Gallbladder polyp, possible sludge    PLAN:    The patient has an incidentally noted small gallbladder polyp.  She has no biliary type symptoms.  I have not recommended she undergo cholecystectomy at this time.  She likely needs further work-up for her intermittent left lower quadrant abdominal pain which I will defer to her primary care provider for.  We will get an ultrasound of her gallbladder in 1 year to reassess the polyp.          This document has been electronically signed by Ender Jonas MD on March 1, 2021 12:51 CST    s

## 2021-04-12 DIAGNOSIS — R53.83 MALAISE AND FATIGUE: Primary | ICD-10-CM

## 2021-04-12 DIAGNOSIS — R53.81 MALAISE AND FATIGUE: Primary | ICD-10-CM

## 2021-04-15 ENCOUNTER — LAB (OUTPATIENT)
Dept: LAB | Facility: HOSPITAL | Age: 41
End: 2021-04-15

## 2021-04-15 LAB
T3 SERPL-MCNC: 104 NG/DL (ref 80–200)
T4 FREE SERPL-MCNC: 1.17 NG/DL (ref 0.93–1.7)
TSH SERPL DL<=0.05 MIU/L-ACNC: 4.58 UIU/ML (ref 0.27–4.2)

## 2021-04-15 PROCEDURE — 86376 MICROSOMAL ANTIBODY EACH: CPT | Performed by: NURSE PRACTITIONER

## 2021-04-15 PROCEDURE — 36415 COLL VENOUS BLD VENIPUNCTURE: CPT | Performed by: NURSE PRACTITIONER

## 2021-04-15 PROCEDURE — 84480 ASSAY TRIIODOTHYRONINE (T3): CPT | Performed by: NURSE PRACTITIONER

## 2021-04-15 PROCEDURE — 84439 ASSAY OF FREE THYROXINE: CPT | Performed by: NURSE PRACTITIONER

## 2021-04-15 PROCEDURE — 84443 ASSAY THYROID STIM HORMONE: CPT | Performed by: NURSE PRACTITIONER

## 2021-04-16 DIAGNOSIS — E03.9 HYPOTHYROIDISM, UNSPECIFIED TYPE: Primary | ICD-10-CM

## 2021-04-16 LAB — THYROPEROXIDASE AB SERPL-ACNC: <9 IU/ML (ref 0–34)

## 2021-04-16 RX ORDER — LEVOTHYROXINE SODIUM 0.03 MG/1
25 TABLET ORAL DAILY
Qty: 90 TABLET | Refills: 3 | Status: SHIPPED | OUTPATIENT
Start: 2021-04-16 | End: 2022-12-28

## 2021-05-20 ENCOUNTER — LAB (OUTPATIENT)
Dept: LAB | Facility: HOSPITAL | Age: 41
End: 2021-05-20

## 2021-05-20 DIAGNOSIS — R53.81 MALAISE AND FATIGUE: Primary | ICD-10-CM

## 2021-05-20 DIAGNOSIS — R53.83 MALAISE AND FATIGUE: Primary | ICD-10-CM

## 2021-05-20 LAB — TSH SERPL DL<=0.05 MIU/L-ACNC: 2.75 UIU/ML (ref 0.27–4.2)

## 2021-05-20 PROCEDURE — 84443 ASSAY THYROID STIM HORMONE: CPT | Performed by: NURSE PRACTITIONER

## 2021-05-20 PROCEDURE — 36415 COLL VENOUS BLD VENIPUNCTURE: CPT | Performed by: NURSE PRACTITIONER

## 2021-05-26 ENCOUNTER — LAB (OUTPATIENT)
Dept: LAB | Facility: HOSPITAL | Age: 41
End: 2021-05-26

## 2021-05-26 PROCEDURE — 36415 COLL VENOUS BLD VENIPUNCTURE: CPT | Performed by: NURSE PRACTITIONER

## 2021-05-26 PROCEDURE — C9803 HOPD COVID-19 SPEC COLLECT: HCPCS

## 2021-05-26 PROCEDURE — 86769 SARS-COV-2 COVID-19 ANTIBODY: CPT | Performed by: NURSE PRACTITIONER

## 2021-05-27 LAB — SARS-COV-2 AB SERPL QL IA: POSITIVE

## 2021-11-17 DIAGNOSIS — Z12.31 VISIT FOR SCREENING MAMMOGRAM: Primary | ICD-10-CM

## 2021-12-06 ENCOUNTER — TELEPHONE (OUTPATIENT)
Dept: FAMILY MEDICINE CLINIC | Facility: CLINIC | Age: 41
End: 2021-12-06

## 2021-12-06 NOTE — TELEPHONE ENCOUNTER
Per ROYER Johnston, Ms. Munoz has been called with recent Screening 3-D Mammogram results & recommendations.  Continue current medications and follow-up as planned or sooner if any problems.         ----- Message from ROYER Small sent at 12/6/2021  8:39 AM CST -----  Can you let her know normal

## 2022-03-01 ENCOUNTER — APPOINTMENT (OUTPATIENT)
Dept: ULTRASOUND IMAGING | Facility: HOSPITAL | Age: 42
End: 2022-03-01

## 2022-04-19 DIAGNOSIS — K82.4 GALLBLADDER POLYP: Primary | ICD-10-CM

## 2022-04-22 ENCOUNTER — HOSPITAL ENCOUNTER (OUTPATIENT)
Dept: ULTRASOUND IMAGING | Facility: HOSPITAL | Age: 42
Discharge: HOME | End: 2022-04-22
Admitting: NURSE PRACTITIONER

## 2022-04-22 DIAGNOSIS — K82.4 GALLBLADDER POLYP: ICD-10-CM

## 2022-04-22 PROCEDURE — 76705 ECHO EXAM OF ABDOMEN: CPT

## 2022-05-16 ENCOUNTER — OFFICE VISIT (OUTPATIENT)
Dept: SURGERY | Facility: CLINIC | Age: 42
End: 2022-05-16

## 2022-05-16 DIAGNOSIS — K82.4 GALLBLADDER POLYP: Primary | ICD-10-CM

## 2022-05-16 PROCEDURE — 99441 PR PHYS/QHP TELEPHONE EVALUATION 5-10 MIN: CPT | Performed by: NURSE PRACTITIONER

## 2022-05-16 NOTE — PROGRESS NOTES
You have chosen to receive care through a telephone visit. Do you consent to use a telephone visit for your medical care today? Yes        Chief Complaint  Follow-up (Gallbladder ultrasound)    Subjective     {Problem List  Visit Diagnosis   Encounters  Notes  Medications  Labs  Result Review Imaging  Media :23}     Kathy Munoz presents to [facility] GENERAL SUR[facility]ERY  History of Present Illness  Ms. Munoz is a 41 year old patient who presents for recheck of gallbladder polyp. She was initially noted to have sludge and possible gallbladder polyp(s) in 2021 on workup for inttermittent abdominal pain. She then had follow up with Dr. Jonas and was not recommended for cholecystectomy at that time as she was not having any biliary type symptoms.  She recently had repeat ultrasound of gallbladder with below results. She denies any post prandial right upper quadrant discomfort, nausea or vomiting. Does admit to some intermittent epigastric abdominal pain which she does not correlate with oral intake.       Study Result  Narrative & Impression   PROCEDURE: Limited abdominal sonogram   COMPARISON: None   HISTORY: gallbladder polyp, K82.4 Cholesterolosis of gallbladder   TECHNIQUE: Realtime grayscale and color-flow imaging is obtained   of the right upper quadrant.   FINDINGS:   The liver is normal in size, echotexture and echogenicity.   No focal lesions or intrahepatic biliary ductal dilatation.   The visualized common duct is within normal limits.   The gallbladder contains a 4 mm polyp. No shadowing stones,   pericholecystic fluid or wall thickening.   No hydronephrosis involving the visualized right kidney.   The visualized pancreatic head and body are within normal limits,   the tail is obscured by bowel gas and cannot be evaluated.   The visualized portions of the aorta and inferior vena cava are   grossly unremarkable.   IMPRESSION:   4 mm gallbladder polyp.    Electronically signed by: David Mancini MD 4/22/2022 4:36 PM CDT   Workstation: [id number]     Discussed findings with Dr. Ward. As this is less than 1cm in diameter and she is asymptomatic he does not recommend cholecystectomy at this time.         Objective   Vital Signs:  There were no vitals taken for this visit.    BMI has not been calculated during today's encounter.       Physical Exam   Deferred due to telephone encounter.        Result Review :       Data reviewed: Radiologic studies ultrasound gallbladder          Assessment and Plan    Diagnoses and all orders for this visit:    1. Gallbladder polyp (Primary)  -     US Gallbladder; Future           I spent 9 minutes caring for Kathy on this date of service. This time includes time spent by me in the following activities:preparing for the visit, reviewing tests, obtaining and/or reviewing a separately obtained history, performing a medically appropriate examination and/or evaluation , documenting information in the medical record and care coordination  Follow Up   Return in about 1 year (around 5/16/2023), or if symptoms worsen or fail to improve. Discussed ultrasound findings with Dr. Ward. As this polyp is less than 1cm in diameter and she is asymptomatic he does not recommend cholecystectomy at this time.  Will recheck with ultrasound in 1 year unless concerns arise sooner.  She verbalizes understanding.       Patient was given instructions and counseling regarding her condition or for health maintenance advice. Please see specific information pulled into the AVS if appropriate.                 This document has been electronically signed by ROYER Powell on May 16, 2022 15:53 CDT

## 2022-08-12 DIAGNOSIS — R53.81 MALAISE AND FATIGUE: ICD-10-CM

## 2022-08-12 DIAGNOSIS — R00.2 PALPITATIONS: ICD-10-CM

## 2022-08-12 DIAGNOSIS — R20.9 SKIN SENSATION DISTURBANCE: ICD-10-CM

## 2022-08-12 DIAGNOSIS — R53.83 MALAISE AND FATIGUE: ICD-10-CM

## 2022-08-12 DIAGNOSIS — Z00.00 WELLNESS EXAMINATION: Primary | ICD-10-CM

## 2022-08-15 ENCOUNTER — LAB (OUTPATIENT)
Dept: LAB | Facility: HOSPITAL | Age: 42
End: 2022-08-15

## 2022-08-15 LAB
25(OH)D3 SERPL-MCNC: 48.7 NG/ML (ref 30–100)
ALBUMIN SERPL-MCNC: 4.6 G/DL (ref 3.5–5.2)
ALBUMIN/GLOB SERPL: 1.8 G/DL
ALP SERPL-CCNC: 46 U/L (ref 39–117)
ALT SERPL W P-5'-P-CCNC: 14 U/L (ref 1–33)
ANION GAP SERPL CALCULATED.3IONS-SCNC: 11.2 MMOL/L (ref 5–15)
AST SERPL-CCNC: 12 U/L (ref 1–32)
BASOPHILS # BLD AUTO: 0.07 10*3/MM3 (ref 0–0.2)
BASOPHILS NFR BLD AUTO: 0.9 % (ref 0–1.5)
BILIRUB SERPL-MCNC: 0.3 MG/DL (ref 0–1.2)
BUN SERPL-MCNC: 14 MG/DL (ref 6–20)
BUN/CREAT SERPL: 20.6 (ref 7–25)
CALCIUM SPEC-SCNC: 9.6 MG/DL (ref 8.6–10.5)
CHLORIDE SERPL-SCNC: 105 MMOL/L (ref 98–107)
CHOLEST SERPL-MCNC: 168 MG/DL (ref 0–200)
CO2 SERPL-SCNC: 22.8 MMOL/L (ref 22–29)
CREAT SERPL-MCNC: 0.68 MG/DL (ref 0.57–1)
DEPRECATED RDW RBC AUTO: 41.2 FL (ref 37–54)
EGFRCR SERPLBLD CKD-EPI 2021: 111.7 ML/MIN/1.73
EOSINOPHIL # BLD AUTO: 0.1 10*3/MM3 (ref 0–0.4)
EOSINOPHIL NFR BLD AUTO: 1.3 % (ref 0.3–6.2)
ERYTHROCYTE [DISTWIDTH] IN BLOOD BY AUTOMATED COUNT: 12.7 % (ref 12.3–15.4)
GLOBULIN UR ELPH-MCNC: 2.6 GM/DL
GLUCOSE SERPL-MCNC: 86 MG/DL (ref 65–99)
HBA1C MFR BLD: 5.3 % (ref 4.8–5.6)
HCT VFR BLD AUTO: 43.3 % (ref 34–46.6)
HDLC SERPL-MCNC: 50 MG/DL (ref 40–60)
HGB BLD-MCNC: 14.3 G/DL (ref 12–15.9)
IMM GRANULOCYTES # BLD AUTO: 0.02 10*3/MM3 (ref 0–0.05)
IMM GRANULOCYTES NFR BLD AUTO: 0.3 % (ref 0–0.5)
IRON 24H UR-MRATE: 80 MCG/DL (ref 37–145)
LDLC SERPL CALC-MCNC: 102 MG/DL (ref 0–100)
LDLC/HDLC SERPL: 2.02 {RATIO}
LYMPHOCYTES # BLD AUTO: 2.21 10*3/MM3 (ref 0.7–3.1)
LYMPHOCYTES NFR BLD AUTO: 28.3 % (ref 19.6–45.3)
MAGNESIUM SERPL-MCNC: 2.4 MG/DL (ref 1.6–2.6)
MCH RBC QN AUTO: 29.5 PG (ref 26.6–33)
MCHC RBC AUTO-ENTMCNC: 33 G/DL (ref 31.5–35.7)
MCV RBC AUTO: 89.5 FL (ref 79–97)
MONOCYTES # BLD AUTO: 0.53 10*3/MM3 (ref 0.1–0.9)
MONOCYTES NFR BLD AUTO: 6.8 % (ref 5–12)
NEUTROPHILS NFR BLD AUTO: 4.89 10*3/MM3 (ref 1.7–7)
NEUTROPHILS NFR BLD AUTO: 62.4 % (ref 42.7–76)
NRBC BLD AUTO-RTO: 0 /100 WBC (ref 0–0.2)
PLATELET # BLD AUTO: 326 10*3/MM3 (ref 140–450)
PMV BLD AUTO: 10.6 FL (ref 6–12)
POTASSIUM SERPL-SCNC: 4.5 MMOL/L (ref 3.5–5.2)
PROT SERPL-MCNC: 7.2 G/DL (ref 6–8.5)
RBC # BLD AUTO: 4.84 10*6/MM3 (ref 3.77–5.28)
SODIUM SERPL-SCNC: 139 MMOL/L (ref 136–145)
T3 SERPL-MCNC: 102 NG/DL (ref 80–200)
T4 FREE SERPL-MCNC: 1.21 NG/DL (ref 0.93–1.7)
TRIGL SERPL-MCNC: 85 MG/DL (ref 0–150)
TSH SERPL DL<=0.05 MIU/L-ACNC: 3.06 UIU/ML (ref 0.27–4.2)
VIT B12 BLD-MCNC: 204 PG/ML (ref 211–946)
VLDLC SERPL-MCNC: 16 MG/DL (ref 5–40)
WBC NRBC COR # BLD: 7.82 10*3/MM3 (ref 3.4–10.8)

## 2022-08-15 PROCEDURE — 82306 VITAMIN D 25 HYDROXY: CPT | Performed by: NURSE PRACTITIONER

## 2022-08-15 PROCEDURE — 84443 ASSAY THYROID STIM HORMONE: CPT | Performed by: NURSE PRACTITIONER

## 2022-08-15 PROCEDURE — 84439 ASSAY OF FREE THYROXINE: CPT | Performed by: NURSE PRACTITIONER

## 2022-08-15 PROCEDURE — 85025 COMPLETE CBC W/AUTO DIFF WBC: CPT | Performed by: NURSE PRACTITIONER

## 2022-08-15 PROCEDURE — 84480 ASSAY TRIIODOTHYRONINE (T3): CPT | Performed by: NURSE PRACTITIONER

## 2022-08-15 PROCEDURE — 80061 LIPID PANEL: CPT | Performed by: NURSE PRACTITIONER

## 2022-08-15 PROCEDURE — 80053 COMPREHEN METABOLIC PANEL: CPT | Performed by: NURSE PRACTITIONER

## 2022-08-15 PROCEDURE — 83540 ASSAY OF IRON: CPT | Performed by: NURSE PRACTITIONER

## 2022-08-15 PROCEDURE — 83036 HEMOGLOBIN GLYCOSYLATED A1C: CPT | Performed by: NURSE PRACTITIONER

## 2022-08-15 PROCEDURE — 82607 VITAMIN B-12: CPT | Performed by: NURSE PRACTITIONER

## 2022-08-15 PROCEDURE — 83735 ASSAY OF MAGNESIUM: CPT | Performed by: NURSE PRACTITIONER

## 2022-08-15 PROCEDURE — 36415 COLL VENOUS BLD VENIPUNCTURE: CPT | Performed by: NURSE PRACTITIONER

## 2022-08-30 DIAGNOSIS — R00.2 PALPITATIONS: Primary | ICD-10-CM

## 2022-10-03 ENCOUNTER — OFFICE VISIT (OUTPATIENT)
Dept: CARDIOLOGY | Facility: CLINIC | Age: 42
End: 2022-10-03

## 2022-10-03 VITALS
SYSTOLIC BLOOD PRESSURE: 152 MMHG | BODY MASS INDEX: 20.8 KG/M2 | HEART RATE: 111 BPM | OXYGEN SATURATION: 98 % | DIASTOLIC BLOOD PRESSURE: 82 MMHG | WEIGHT: 117.4 LBS | HEIGHT: 63 IN

## 2022-10-03 DIAGNOSIS — R03.0 ELEVATED BLOOD PRESSURE READING IN OFFICE WITHOUT DIAGNOSIS OF HYPERTENSION: ICD-10-CM

## 2022-10-03 DIAGNOSIS — I49.3 SYMPTOMATIC PVCS: ICD-10-CM

## 2022-10-03 DIAGNOSIS — R00.2 PALPITATIONS: Primary | ICD-10-CM

## 2022-10-03 PROCEDURE — 93000 ELECTROCARDIOGRAM COMPLETE: CPT | Performed by: INTERNAL MEDICINE

## 2022-10-03 PROCEDURE — 99214 OFFICE O/P EST MOD 30 MIN: CPT | Performed by: NURSE PRACTITIONER

## 2022-10-03 RX ORDER — MAGNESIUM OXIDE 400 MG/1
400 TABLET ORAL 2 TIMES DAILY
Qty: 60 TABLET | Refills: 3 | Status: SHIPPED | OUTPATIENT
Start: 2022-10-03

## 2022-10-03 NOTE — PROGRESS NOTES
"Palpitations      History of Present Illness     Kathy Munoz is a 41-year-old  female who presents today with her spouse.  She has a medical history of hyperlipidemia (chronic, managed with Pravastatin), IBS (chronic, managed with Linzess), Hypothyroidism (chronic, managed with Levothyroxine), anxiety, palpitations, history of rheumatic fever as a child who presents here today for evaluation of intermittent palpitations.  Patient reports being previously evaluated by Dr. Zhu and underwent heads-up tilt table test in 2013 which was negative for neurocardiogenic etiology for syncope.  Exercise Cardiolite in 2020 negative for exercise-induced arrhythmias, EKG showing no changes, low risk study.  Patient also has been evaluated by electrophysiologist in [place].  Patient is unsure of the name.  She reports at that time underwent Holter monitor which showed PVCs.  Patient's symptoms are described as fluttering, heart \"flip flopping\".  Waxing and waning.  Symptoms symptoms will occur couple times a week, sometimes can occur for a month.  Patient denies edema, chest pain, shortness of breath, dizziness or syncope.     EKG today showing sinus tachycardia.  Prior to today's visit she underwent Holter monitor which showed average heart rate at 82 bpm.  Patient symptoms related with PVCs.  PVC burden is normal at less than 1% however she is symptomatic with these has a hyper awareness.      Past Medical History:   Diagnosis Date   • Hyperlipidemia      Past Surgical History:   Procedure Laterality Date   • AUGMENTATION MAMMAPLASTY     • COLONOSCOPY N/A 11/13/2020    Procedure: COLONOSCOPY;  Surgeon: Damaso Delgado DO;  Location: [facility] ENDOSCOPY;  Service: Gastroenterology;  Laterality: N/A;   • TONSILLECTOMY       Social History     Socioeconomic History   • Marital status:    Tobacco Use   • Smoking status: Never Smoker   • Smokeless tobacco: Never Used   Substance and Sexual Activity   • Drug " use: Never     Family History   Problem Relation Age of Onset   • Breast cancer Paternal Grandmother        ALLERGIES:  Allergies   Allergen Reactions   • Avelox [Moxifloxacin] Swelling     Throat swelling    • Codeine Nausea And Vomiting         Review of Systems   Constitutional: Negative for chills, fever, malaise/fatigue and weight gain.   HENT: Negative for nosebleeds and tinnitus.    Eyes: Negative for blurred vision and double vision.   Cardiovascular: Positive for palpitations. Negative for chest pain, dyspnea on exertion, irregular heartbeat, leg swelling and syncope.   Respiratory: Negative for cough, shortness of breath, sleep disturbances due to breathing and snoring.    Endocrine: Negative for polydipsia, polyphagia and polyuria.   Hematologic/Lymphatic: Negative for bleeding problem. Does not bruise/bleed easily.   Skin: Negative for color change and suspicious lesions.   Musculoskeletal: Negative for falls and myalgias.   Gastrointestinal: Negative for bloating, heartburn and hematochezia.   Genitourinary: Negative for dysuria and hematuria.   Neurological: Negative for dizziness, headaches, seizures, vertigo and weakness.   Psychiatric/Behavioral: Negative for altered mental status and depression. The patient is nervous/anxious. The patient does not have insomnia.    Allergic/Immunologic: Negative for environmental allergies and persistent infections.       Current Outpatient Medications   Medication Sig Dispense Refill   • linaclotide (LINZESS) 290 MCG capsule capsule Take 290 mcg by mouth Every Morning Before Breakfast.     • pravastatin (PRAVACHOL) 40 MG tablet Take 40 mg by mouth Daily.     • diazePAM (Valium) 5 MG tablet Take 1 tablet by mouth 2 (Two) Times a Day As Needed for Anxiety. 4 tablet 0   • levothyroxine (Synthroid) 25 MCG tablet Take 1 tablet by mouth Daily. 90 tablet 3   • magnesium oxide (MAG-OX) 400 MG tablet Take 1 tablet by mouth 2 (Two) Times a Day. 60 tablet 3     No current  "facility-administered medications for this visit.       OBJECTIVE:    Physical Exam:   Vitals reviewed.   Constitutional:       General: Not in acute distress.     Appearance: Normal appearance. Well-developed. Not toxic-appearing or diaphoretic.   Eyes:      General: Lids are normal.      Conjunctiva/sclera: Conjunctivae normal.   HENT:      Head: Normocephalic and atraumatic.      Right Ear: External ear normal.      Left Ear: External ear normal.   Neck:      Vascular: No JVD.   Pulmonary:      Effort: Pulmonary effort is normal. No respiratory distress.      Breath sounds: Normal breath sounds. No decreased breath sounds. No wheezing. No rales.   Chest:      Chest wall: Not tender to palpatation.   Cardiovascular:      PMI at left midclavicular line. Normal rate. Regular rhythm. Normal S1 with normal intensity. Normal S2 with normal intensity.      Murmurs: There is no murmur.      No gallop. No S3 and S4 gallop. No click. No rub.   Pulses:     Intact distal pulses. No decreased pulses.   Edema:     Peripheral edema absent.   Abdominal:      General: Bowel sounds are normal. There is no distension.      Palpations: Abdomen is soft.      Tenderness: There is no abdominal tenderness.   Musculoskeletal:      Cervical back: Normal range of motion and neck supple. Skin:     General: Skin is warm and dry.      Coloration: Skin is not pale.      Findings: No erythema or rash.   Neurological:      Mental Status: Alert and oriented to person, place, and time.      Gait: Gait normal.   Psychiatric:         Mood and Affect: Mood is anxious.         Behavior: Behavior normal.         Thought Content: Thought content normal.         Judgment: Judgment normal.       Vitals:    10/03/22 1301   BP: 152/82   BP Location: Left arm   Patient Position: Sitting   Cuff Size: Adult   Pulse: 111   SpO2: 98%   Weight: 53.3 kg (117 lb 6.4 oz)   Height: 160 cm (63\")       DATA REVIEWED:       No radiology results for the last 30 " days.    Labs: BMP, CBC, LIPID, TSH  Lab Results   Component Value Date    GLUCOSE 86 08/15/2022    CALCIUM 9.6 08/15/2022     08/15/2022    K 4.5 08/15/2022    CO2 22.8 08/15/2022     08/15/2022    BUN 14 08/15/2022    CREATININE 0.68 08/15/2022    EGFRIFNONA 91 08/03/2020    BCR 20.6 08/15/2022    ANIONGAP 11.2 08/15/2022     Lab Results   Component Value Date    WBC 7.82 08/15/2022    HGB 14.3 08/15/2022    HCT 43.3 08/15/2022    MCV 89.5 08/15/2022     08/15/2022     Lab Results   Component Value Date    CHOL 168 08/15/2022    CHOL 178 09/30/2020    CHOL 158 09/10/2019     Lab Results   Component Value Date    TRIG 85 08/15/2022    TRIG 85 09/30/2020    TRIG 79 09/10/2019     Lab Results   Component Value Date    HDL 50 08/15/2022    HDL 55 09/30/2020    HDL 50 09/10/2019     No components found for: LDLCALC  Lab Results   Component Value Date     (H) 08/15/2022     (H) 09/30/2020    LDL 92 09/10/2019     No results found for: HDLLDLRATIO  No components found for: CHOLHDL  Lab Results   Component Value Date    TSH 3.060 08/15/2022     No results found for: PROBNP  EKG:           Stress tests: 2020  Stress Procedure    Rest ECG There was no ST segment deviation noted.   Stress Description A stress test was performed following the Ha protocol.   The patient reached the end of the protocol and achieved the target heart rate. The patient requested the test to be stopped  because the patient experienced fatigue.   Onset of symptoms occurred at stage 3 of the protocol.   Stress ECG Stress ECG of normal sinus rhythm noted. Normal ECG with no significant stress induced changes noted.   There was no ST segment deviation noted during stress.   There were no arrhythmias during stress.   There were no significant arrhythmias noted during stress.      Stress ECG was interpretable.   Recovery ECG During recovery, the patient complained of no significant symptoms following stress.   Sinus  rhythm was noted during recovery. Normal ECG with no significant recovery phase changes noted.   Stress Findings No ECG evidence of myocardial ischemia.Negative clinical evidence of myocardial ischemia. Findings consistent with a normal ECG stress test. Patient exercised on Ha protocol for a total duration of 8 minutes and 29 seconds achieving a peak heart rate of 164 which is 91% of the maximum predicted heart rate for age and a peak blood pressure of 167/84.  Patient did not have any symptoms of chest discomfort or any diagnostic electrocardiographic changes suggestive of ischemia.  Patient had normal hemodynamic response to exercise.    Final impression: Negative nuclear Cardiolite stress test for any exercise-induced symptoms of chest pain arrhythmias or any electrocardiographic changes suggestive of ischemia.                                       .       Order-Level Documents:    Scan on 6/29/2020 by Soham Zhu MD: TREADMILL STRESS TEST, [facility], 06/29/2020                   The following portions of the patient's history were reviewed and updated as appropriate: allergies, current medications, past family history, past medical history, past social history, past surgical history and problem list.  Old records reviewed and pertinent information is included in the above objective data.     ASSESSMENT/PLAN:       Diagnosis Plan   1. Palpitations  ECG 12 Lead    magnesium oxide (MAG-OX) 400 MG tablet   2. Symptomatic PVCs  magnesium oxide (MAG-OX) 400 MG tablet   3. Elevated blood pressure reading in office without diagnosis of hypertension         #1. And #2. Palpitations: cause appears to be from symptomatic PVCs. EKG today showing sinus tachycardia.  Prior to today's visit she underwent Holter monitor which showed average heart rate at 82 bpm.  Patient triggered symptoms correlated to PVCs.  PVC burden is normal at less than 1% however she is symptomatic with these has a hyper awareness.  -echocardiogram  to assess for structural HD  -May try Magnesium supplementation, Trial of beta-blocker suppression was discussed and patient opted out of this for now.      Discussed the benign nature of the majority of causes of palpitations.   Discussed lifestyle modifications including reducing caffeine intake, reducing stress, and increasing exercise tolerance.  Reassurance given to patient.    #3. Blood pressure elevated today in office, however normal at other visits. ? White coat syndrome. Will continue to monitor.     I spent 32 minutes caring for Kathy on this date of service. This time includes time spent by me in the following activities: preparing for the visit, reviewing tests, obtaining and/or reviewing a separately obtained history, performing a medically appropriate examination and/or evaluation, counseling and educating the patient/family/caregiver, ordering medications, tests, or procedures, referring and communicating with other health care professionals, documenting information in the medical record, independently interpreting results and communicating that information with the patient/family/caregiver and care coordination              This document has been electronically signed by ROYER Garcia on October 3, 2022 16:10 CDT

## 2022-10-05 LAB
QT INTERVAL: 320 MS
QTC INTERVAL: 435 MS

## 2022-12-28 ENCOUNTER — OFFICE VISIT (OUTPATIENT)
Dept: CARDIOLOGY | Facility: CLINIC | Age: 42
End: 2022-12-28

## 2022-12-28 VITALS
OXYGEN SATURATION: 99 % | DIASTOLIC BLOOD PRESSURE: 79 MMHG | WEIGHT: 113 LBS | HEART RATE: 118 BPM | HEIGHT: 63 IN | BODY MASS INDEX: 20.02 KG/M2 | SYSTOLIC BLOOD PRESSURE: 128 MMHG

## 2022-12-28 DIAGNOSIS — I49.3 SYMPTOMATIC PVCS: ICD-10-CM

## 2022-12-28 DIAGNOSIS — R03.0 ELEVATED BLOOD PRESSURE READING IN OFFICE WITHOUT DIAGNOSIS OF HYPERTENSION: ICD-10-CM

## 2022-12-28 DIAGNOSIS — R00.2 PALPITATIONS: Primary | ICD-10-CM

## 2022-12-28 PROCEDURE — 99442 PR PHYS/QHP TELEPHONE EVALUATION 11-20 MIN: CPT | Performed by: NURSE PRACTITIONER

## 2022-12-28 NOTE — PROGRESS NOTES
"Palpitations      Non-Face-To-Face Scheduled Telephone Service     Patient has requested telephone service in lieu of a follow-up appointment for results and advice.  This patient is an established patient.  There is no face-to-face service planned within the next 24 hours.  There also has been no E/M in the past 7 days. Time spent       History of Present Illness     Kathy Munoz is a very pleasant 41-year-old  female who returns for follow up today. She has a medical history of hyperlipidemia (chronic, managed with Pravastatin), IBS (chronic, managed with Linzess), Hypothyroidism (chronic, managed with Levothyroxine), anxiety, palpitations, history of rheumatic fever as a child who presents here today for evaluation of intermittent palpitations.  Patient reports being previously evaluated by Dr. Zhu and underwent heads-up tilt table test in 2013 which was negative for neurocardiogenic etiology for syncope.  Exercise Cardiolite in 2020 negative for exercise-induced arrhythmias, EKG showing no changes, low risk study.  Patient also has been evaluated by electrophysiologist in [place].  Patient is unsure of the name.  She reports at that time underwent Holter monitor which showed PVCs.  Patient's symptoms are described as fluttering, heart \"flip flopping\".  Waxing and waning.  Symptoms symptoms will occur couple times a week, sometimes can occur for a month.  Patient denies edema, chest pain, shortness of breath, dizziness or syncope.     EKG today showing sinus tachycardia. She underwent Holter monitor which showed average heart rate at 82 bpm.  Patient symptoms related with PVCs.  PVC burden is normal at less than 1% however she is symptomatic with these has a hyper awareness.  Since last visit patient reports symptoms have improved at this time.  She reports symptoms will wax and wane.  Sometimes will occur for several weeks, sometimes will have them for a couple months.  She reports worsening with " stress.  Has been monitoring blood pressure and heart rate at home with good readings. Blood pressure and HR elevated when stressed. Echo showing normal heart function at 61-65%, normal diastolic function, no significant valvular abnormalities.  Patient was tachycardic during exam.    Past Medical History:   Diagnosis Date   • Hyperlipidemia      Past Surgical History:   Procedure Laterality Date   • AUGMENTATION MAMMAPLASTY     • COLONOSCOPY N/A 11/13/2020    Procedure: COLONOSCOPY;  Surgeon: Damaso Delgado DO;  Location: [facility] ENDOSCOPY;  Service: Gastroenterology;  Laterality: N/A;   • TONSILLECTOMY       Social History     Socioeconomic History   • Marital status:    Tobacco Use   • Smoking status: Never   • Smokeless tobacco: Never   Substance and Sexual Activity   • Drug use: Never     Family History   Problem Relation Age of Onset   • Breast cancer Paternal Grandmother        ALLERGIES:  Allergies   Allergen Reactions   • Avelox [Moxifloxacin] Swelling     Throat swelling    • Codeine Nausea And Vomiting   • Lubiprostone Other (See Comments)         Review of Systems   Cardiovascular: Positive for palpitations. Negative for chest pain, dyspnea on exertion, irregular heartbeat, leg swelling, paroxysmal nocturnal dyspnea and syncope.   Respiratory: Negative for cough and shortness of breath.    Psychiatric/Behavioral: The patient is nervous/anxious.        Current Outpatient Medications   Medication Sig Dispense Refill   • diazePAM (Valium) 5 MG tablet Take 1 tablet by mouth 2 (Two) Times a Day As Needed for Anxiety. 4 tablet 0   • linaclotide (LINZESS) 290 MCG capsule capsule Take 290 mcg by mouth Every Morning Before Breakfast.     • magnesium oxide (MAG-OX) 400 MG tablet Take 1 tablet by mouth 2 (Two) Times a Day. 60 tablet 3   • pravastatin (PRAVACHOL) 40 MG tablet Take 40 mg by mouth Daily.       No current facility-administered medications for this visit.       OBJECTIVE:    Physical Exam: No  "physical exam. Telehealth visit  Physical Exam  Vitals:    12/28/22 1303   BP: 128/79   BP Location: Other (Comment)   Patient Position: Sitting   Cuff Size: Adult   Pulse: 118   SpO2: 99%   Weight: 51.3 kg (113 lb)   Height: 160 cm (63\")       DATA REVIEWED:   Results for orders placed in visit on 12/14/22    Adult Transthoracic Echo Complete w/ Color, Spectral and Contrast if Necessary Per Protocol    Interpretation Summary  •  Left ventricular systolic function is normal. Estimated left ventricular EF = 64% Left ventricular ejection fraction appears to be 61 - 65%.  •  Left ventricular diastolic function was normal.  •  There is no evidence of pericardial effusion.      No radiology results for the last 30 days.    Labs: BMP, CBC, LIPID, TSH  Lab Results   Component Value Date    GLUCOSE 86 08/15/2022    CALCIUM 9.6 08/15/2022     08/15/2022    K 4.5 08/15/2022    CO2 22.8 08/15/2022     08/15/2022    BUN 14 08/15/2022    CREATININE 0.68 08/15/2022    EGFRIFNONA 91 08/03/2020    BCR 20.6 08/15/2022    ANIONGAP 11.2 08/15/2022     Lab Results   Component Value Date    WBC 7.82 08/15/2022    HGB 14.3 08/15/2022    HCT 43.3 08/15/2022    MCV 89.5 08/15/2022     08/15/2022     Lab Results   Component Value Date    CHOL 168 08/15/2022    CHOL 178 09/30/2020    CHOL 158 09/10/2019     Lab Results   Component Value Date    TRIG 85 08/15/2022    TRIG 85 09/30/2020    TRIG 79 09/10/2019     Lab Results   Component Value Date    HDL 50 08/15/2022    HDL 55 09/30/2020    HDL 50 09/10/2019     No components found for: LDLCALC  Lab Results   Component Value Date     (H) 08/15/2022     (H) 09/30/2020    LDL 92 09/10/2019     No results found for: HDLLDLRATIO  No components found for: CHOLHDL  Lab Results   Component Value Date    TSH 3.060 08/15/2022     No results found for: PROBNP  EKG:           Stress tests: 2020  Stress Procedure    Rest ECG There was no ST segment deviation noted. "   Stress Description A stress test was performed following the Ha protocol.   The patient reached the end of the protocol and achieved the target heart rate. The patient requested the test to be stopped  because the patient experienced fatigue.   Onset of symptoms occurred at stage 3 of the protocol.   Stress ECG Stress ECG of normal sinus rhythm noted. Normal ECG with no significant stress induced changes noted.   There was no ST segment deviation noted during stress.   There were no arrhythmias during stress.   There were no significant arrhythmias noted during stress.      Stress ECG was interpretable.   Recovery ECG During recovery, the patient complained of no significant symptoms following stress.   Sinus rhythm was noted during recovery. Normal ECG with no significant recovery phase changes noted.   Stress Findings No ECG evidence of myocardial ischemia.Negative clinical evidence of myocardial ischemia. Findings consistent with a normal ECG stress test. Patient exercised on Ha protocol for a total duration of 8 minutes and 29 seconds achieving a peak heart rate of 164 which is 91% of the maximum predicted heart rate for age and a peak blood pressure of 167/84.  Patient did not have any symptoms of chest discomfort or any diagnostic electrocardiographic changes suggestive of ischemia.  Patient had normal hemodynamic response to exercise.    Final impression: Negative nuclear Cardiolite stress test for any exercise-induced symptoms of chest pain arrhythmias or any electrocardiographic changes suggestive of ischemia.                                       .       Order-Level Documents:    Scan on 6/29/2020 by Soham Zhu MD: TREADMILL STRESS TEST, [facility], 06/29/2020               HOLTER:     • The predominant rhythm noted during the testing period was sinus rhythm. Rare PACs and rare PVCs were noted.  • Some episodes of skipped/irregular beats and fluttering/racing correlated with sinus mechanism,  others correlated with PVCs.  • Most patient triggered events correlated with PVCs.  • There was one episode of non-sustained (lasting 5 beats) supraventricular tachycardia (undefined mechanism). No symptoms were reported during this episode.    The following portions of the patient's history were reviewed and updated as appropriate: allergies, current medications, past family history, past medical history, past social history, past surgical history and problem list.  Old records reviewed and pertinent information is included in the above objective data.     ASSESSMENT/PLAN:       Diagnosis Plan   1. Palpitations        2. Symptomatic PVCs        3. Elevated blood pressure reading in office without diagnosis of hypertension            #1. And #2. Palpitations: cause appears to be from symptomatic PVCs. EKG showing sinus tachycardia.  Prior to today's visit she underwent Holter monitor which showed average heart rate at 82 bpm.  Patient triggered symptoms correlated to PVCs.  PVC burden is normal at less than 1% however she is symptomatic with these has a hyper awareness.  -Echo showing normal heart function at 61-65%, normal diastolic function, no significant valvular abnormalities.  -May try Magnesium supplementation, Trial of beta-blocker suppression was discussed and patient opted out of this for now. We discussed calling for worsening symptoms and maybe discussing anxiety with PCP      Discussed the benign nature of the majority of causes of palpitations.   Discussed lifestyle modifications including reducing caffeine intake, reducing stress, and increasing exercise tolerance.  Reassurance given to patient.    #3. Blood pressure normal today. Likely white coat syndrome. No evidence of LVH or diastolic dysfunction on echo.    I spent 20 minutes caring for Kathy on this date of service. This time includes time spent by me in the following activities: preparing for the visit, reviewing tests, obtaining and/or  reviewing a separately obtained history, performing a medically appropriate examination and/or evaluation, counseling and educating the patient/family/caregiver and documenting information in the medical record    Follow up : PRN, return if needed          This document has been electronically signed by ROYER Garcia on December 28, 2022 13:33 CST

## 2023-02-13 RX ORDER — DEXTROMETHORPHAN HYDROBROMIDE AND PROMETHAZINE HYDROCHLORIDE 15; 6.25 MG/5ML; MG/5ML
5 SYRUP ORAL 4 TIMES DAILY PRN
Qty: 180 ML | Refills: 0 | Status: SHIPPED | OUTPATIENT
Start: 2023-02-13

## 2023-02-13 RX ORDER — METHYLPREDNISOLONE 4 MG/1
TABLET ORAL
Qty: 21 TABLET | Refills: 0 | Status: SHIPPED | OUTPATIENT
Start: 2023-02-13

## 2023-02-13 RX ORDER — CLARITHROMYCIN 500 MG/1
500 TABLET, COATED ORAL 2 TIMES DAILY
Qty: 20 TABLET | Refills: 0 | Status: SHIPPED | OUTPATIENT
Start: 2023-02-13

## 2023-05-15 DIAGNOSIS — Z12.31 VISIT FOR SCREENING MAMMOGRAM: Primary | ICD-10-CM

## 2023-06-09 DIAGNOSIS — Z12.31 ENCOUNTER FOR SCREENING MAMMOGRAM FOR MALIGNANT NEOPLASM OF BREAST: Primary | ICD-10-CM

## 2023-08-11 RX ORDER — PRAVASTATIN SODIUM 40 MG
40 TABLET ORAL DAILY
Qty: 90 TABLET | Refills: 3 | Status: SHIPPED | OUTPATIENT
Start: 2023-08-11

## (undated) DEVICE — CANN SMPL SOFTECH BIFLO ETCO2 A/M 7FT